# Patient Record
Sex: FEMALE | Race: WHITE | NOT HISPANIC OR LATINO | Employment: FULL TIME | URBAN - METROPOLITAN AREA
[De-identification: names, ages, dates, MRNs, and addresses within clinical notes are randomized per-mention and may not be internally consistent; named-entity substitution may affect disease eponyms.]

---

## 2020-12-30 ENCOUNTER — LAB VISIT (OUTPATIENT)
Dept: PRIMARY CARE CLINIC | Facility: OTHER | Age: 40
End: 2020-12-30
Attending: INTERNAL MEDICINE
Payer: COMMERCIAL

## 2020-12-30 DIAGNOSIS — Z03.818 ENCOUNTER FOR OBSERVATION FOR SUSPECTED EXPOSURE TO OTHER BIOLOGICAL AGENTS RULED OUT: ICD-10-CM

## 2020-12-30 PROCEDURE — U0003 INFECTIOUS AGENT DETECTION BY NUCLEIC ACID (DNA OR RNA); SEVERE ACUTE RESPIRATORY SYNDROME CORONAVIRUS 2 (SARS-COV-2) (CORONAVIRUS DISEASE [COVID-19]), AMPLIFIED PROBE TECHNIQUE, MAKING USE OF HIGH THROUGHPUT TECHNOLOGIES AS DESCRIBED BY CMS-2020-01-R: HCPCS

## 2020-12-31 LAB — SARS-COV-2 RNA RESP QL NAA+PROBE: NOT DETECTED

## 2021-08-02 ENCOUNTER — TELEPHONE (OUTPATIENT)
Dept: OBSTETRICS AND GYNECOLOGY | Facility: CLINIC | Age: 41
End: 2021-08-02

## 2021-11-12 ENCOUNTER — OFFICE VISIT (OUTPATIENT)
Dept: OBSTETRICS AND GYNECOLOGY | Facility: CLINIC | Age: 41
End: 2021-11-12
Payer: COMMERCIAL

## 2021-11-12 VITALS — WEIGHT: 140.63 LBS | BODY MASS INDEX: 22.7 KG/M2

## 2021-11-12 DIAGNOSIS — Z01.419 ENCOUNTER FOR ANNUAL ROUTINE GYNECOLOGICAL EXAMINATION: Primary | ICD-10-CM

## 2021-11-12 PROCEDURE — 99396 PR PREVENTIVE VISIT,EST,40-64: ICD-10-PCS | Mod: S$GLB,,, | Performed by: OBSTETRICS & GYNECOLOGY

## 2021-11-12 PROCEDURE — 3008F BODY MASS INDEX DOCD: CPT | Mod: CPTII,S$GLB,, | Performed by: OBSTETRICS & GYNECOLOGY

## 2021-11-12 PROCEDURE — 1160F PR REVIEW ALL MEDS BY PRESCRIBER/CLIN PHARMACIST DOCUMENTED: ICD-10-PCS | Mod: CPTII,S$GLB,, | Performed by: OBSTETRICS & GYNECOLOGY

## 2021-11-12 PROCEDURE — 99396 PREV VISIT EST AGE 40-64: CPT | Mod: S$GLB,,, | Performed by: OBSTETRICS & GYNECOLOGY

## 2021-11-12 PROCEDURE — 3008F PR BODY MASS INDEX (BMI) DOCUMENTED: ICD-10-PCS | Mod: CPTII,S$GLB,, | Performed by: OBSTETRICS & GYNECOLOGY

## 2021-11-12 PROCEDURE — 88175 CYTOPATH C/V AUTO FLUID REDO: CPT | Performed by: OBSTETRICS & GYNECOLOGY

## 2021-11-12 PROCEDURE — 1159F MED LIST DOCD IN RCRD: CPT | Mod: CPTII,S$GLB,, | Performed by: OBSTETRICS & GYNECOLOGY

## 2021-11-12 PROCEDURE — 99999 PR PBB SHADOW E&M-EST. PATIENT-LVL III: ICD-10-PCS | Mod: PBBFAC,,, | Performed by: OBSTETRICS & GYNECOLOGY

## 2021-11-12 PROCEDURE — 99999 PR PBB SHADOW E&M-EST. PATIENT-LVL III: CPT | Mod: PBBFAC,,, | Performed by: OBSTETRICS & GYNECOLOGY

## 2021-11-12 PROCEDURE — 1159F PR MEDICATION LIST DOCUMENTED IN MEDICAL RECORD: ICD-10-PCS | Mod: CPTII,S$GLB,, | Performed by: OBSTETRICS & GYNECOLOGY

## 2021-11-12 PROCEDURE — 1160F RVW MEDS BY RX/DR IN RCRD: CPT | Mod: CPTII,S$GLB,, | Performed by: OBSTETRICS & GYNECOLOGY

## 2021-11-20 LAB
FINAL PATHOLOGIC DIAGNOSIS: NORMAL
Lab: NORMAL

## 2022-08-09 ENCOUNTER — PATIENT MESSAGE (OUTPATIENT)
Dept: OBSTETRICS AND GYNECOLOGY | Facility: CLINIC | Age: 42
End: 2022-08-09
Payer: COMMERCIAL

## 2022-10-07 ENCOUNTER — LAB VISIT (OUTPATIENT)
Dept: LAB | Facility: HOSPITAL | Age: 42
End: 2022-10-07
Attending: MEDICAL GENETICS
Payer: COMMERCIAL

## 2022-10-07 DIAGNOSIS — Z84.89 FAMILY HISTORY OF GENETIC DISORDER: ICD-10-CM

## 2022-10-07 DIAGNOSIS — Z84.89 FAMILY HISTORY OF GENETIC DISORDER: Primary | ICD-10-CM

## 2022-10-07 PROCEDURE — 36415 COLL VENOUS BLD VENIPUNCTURE: CPT | Performed by: MEDICAL GENETICS

## 2022-10-13 LAB
GENETIC COUNSELING?: YES
GENSO SPECIMEN TYPE: NORMAL
MISCELLANEOUS GENETIC TEST NAME: NORMAL
PARTENTAL OR SIBLING TESTING?: YES
REFERENCE LAB: NORMAL
TEST RESULT: NORMAL

## 2022-11-23 ENCOUNTER — OFFICE VISIT (OUTPATIENT)
Dept: OBSTETRICS AND GYNECOLOGY | Facility: CLINIC | Age: 42
End: 2022-11-23
Payer: COMMERCIAL

## 2022-11-23 VITALS
RESPIRATION RATE: 18 BRPM | SYSTOLIC BLOOD PRESSURE: 124 MMHG | WEIGHT: 119.06 LBS | HEIGHT: 65 IN | DIASTOLIC BLOOD PRESSURE: 80 MMHG | BODY MASS INDEX: 19.83 KG/M2 | HEART RATE: 78 BPM

## 2022-11-23 DIAGNOSIS — Z12.4 SCREENING FOR MALIGNANT NEOPLASM OF CERVIX: Primary | ICD-10-CM

## 2022-11-23 DIAGNOSIS — Z01.419 ENCOUNTER FOR ANNUAL ROUTINE GYNECOLOGICAL EXAMINATION: ICD-10-CM

## 2022-11-23 DIAGNOSIS — Z12.39 SCREENING BREAST EXAMINATION: ICD-10-CM

## 2022-11-23 PROCEDURE — 3079F DIAST BP 80-89 MM HG: CPT | Mod: CPTII,S$GLB,, | Performed by: OBSTETRICS & GYNECOLOGY

## 2022-11-23 PROCEDURE — 1160F RVW MEDS BY RX/DR IN RCRD: CPT | Mod: CPTII,S$GLB,, | Performed by: OBSTETRICS & GYNECOLOGY

## 2022-11-23 PROCEDURE — 3079F PR MOST RECENT DIASTOLIC BLOOD PRESSURE 80-89 MM HG: ICD-10-PCS | Mod: CPTII,S$GLB,, | Performed by: OBSTETRICS & GYNECOLOGY

## 2022-11-23 PROCEDURE — 1160F PR REVIEW ALL MEDS BY PRESCRIBER/CLIN PHARMACIST DOCUMENTED: ICD-10-PCS | Mod: CPTII,S$GLB,, | Performed by: OBSTETRICS & GYNECOLOGY

## 2022-11-23 PROCEDURE — 99396 PREV VISIT EST AGE 40-64: CPT | Mod: S$GLB,,, | Performed by: OBSTETRICS & GYNECOLOGY

## 2022-11-23 PROCEDURE — 3074F PR MOST RECENT SYSTOLIC BLOOD PRESSURE < 130 MM HG: ICD-10-PCS | Mod: CPTII,S$GLB,, | Performed by: OBSTETRICS & GYNECOLOGY

## 2022-11-23 PROCEDURE — 99999 PR PBB SHADOW E&M-EST. PATIENT-LVL IV: CPT | Mod: PBBFAC,,, | Performed by: OBSTETRICS & GYNECOLOGY

## 2022-11-23 PROCEDURE — 1159F MED LIST DOCD IN RCRD: CPT | Mod: CPTII,S$GLB,, | Performed by: OBSTETRICS & GYNECOLOGY

## 2022-11-23 PROCEDURE — 3074F SYST BP LT 130 MM HG: CPT | Mod: CPTII,S$GLB,, | Performed by: OBSTETRICS & GYNECOLOGY

## 2022-11-23 PROCEDURE — 3008F PR BODY MASS INDEX (BMI) DOCUMENTED: ICD-10-PCS | Mod: CPTII,S$GLB,, | Performed by: OBSTETRICS & GYNECOLOGY

## 2022-11-23 PROCEDURE — 99396 PR PREVENTIVE VISIT,EST,40-64: ICD-10-PCS | Mod: S$GLB,,, | Performed by: OBSTETRICS & GYNECOLOGY

## 2022-11-23 PROCEDURE — 3008F BODY MASS INDEX DOCD: CPT | Mod: CPTII,S$GLB,, | Performed by: OBSTETRICS & GYNECOLOGY

## 2022-11-23 PROCEDURE — 88175 CYTOPATH C/V AUTO FLUID REDO: CPT | Performed by: OBSTETRICS & GYNECOLOGY

## 2022-11-23 PROCEDURE — 1159F PR MEDICATION LIST DOCUMENTED IN MEDICAL RECORD: ICD-10-PCS | Mod: CPTII,S$GLB,, | Performed by: OBSTETRICS & GYNECOLOGY

## 2022-11-23 PROCEDURE — 99999 PR PBB SHADOW E&M-EST. PATIENT-LVL IV: ICD-10-PCS | Mod: PBBFAC,,, | Performed by: OBSTETRICS & GYNECOLOGY

## 2022-11-23 NOTE — PROGRESS NOTES
Chief Complaint   Patient presents with    Annual Exam       History and Physical:  No LMP recorded.       Zahira Kovacs is a 41 y.o. No obstetric history on file. WF who presents today for her routine annual GYN exam. The patient has no Gynecology complaints today. S/p moraima and f/u CT      Allergies:   Review of patient's allergies indicates:   Allergen Reactions    Aspirin Shortness Of Breath    Sulfamethoxazole-trimethoprim Shortness Of Breath    Vistaril [hydroxyzine pamoate] Shortness Of Breath    Gadolinium-containing contrast media Rash     Mild localized rash in chest area; denied (-) itchiness and (-) shortness of breath  Mild localized rash in chest area; denied (-) itchiness and (-) shortness of breath         Past Medical History:   Diagnosis Date    Allergy     Breast disorder     Depression     GERD (gastroesophageal reflux disease)        Past Surgical History:   Procedure Laterality Date     SECTION      CRANIOTOMY  2021    micro decompression of facial nerve    LAPAROSCOPIC CHOLECYSTECTOMY N/A 2022    Procedure: CHOLECYSTECTOMY, LAPAROSCOPIC;  Surgeon: Raman Camargo MD;  Location: [facility];  Service: General;  Laterality: N/A;       MEDS:   Current Outpatient Medications on File Prior to Visit   Medication Sig Dispense Refill    cholecalciferol, vitamin D3, (VITAMIN D3) 25 mcg (1,000 unit) capsule Take 1,000 Units by mouth once daily.      enzymes,digestive (DIGESTIVE ENZYMES ORAL) Take by mouth.      multivitamin capsule Take 1 capsule by mouth once daily.      omega-3 fatty acids/fish oil (FISH OIL-OMEGA-3 FATTY ACIDS) 300-1,000 mg capsule Take by mouth once daily.      onabotulinumtoxinA (BOTOX INJ) Inject as directed. Every 4 months facial area      sertraline (ZOLOFT) 50 MG tablet Take 1 tablet by mouth once daily.      B5/B6/PABA/cordy/rhodi/ginseng (ADRENAL ESSENCE ORAL) Take by mouth.      ferrous sulfate (FEOSOL) Tab tablet Take 1 tablet by mouth daily with  "breakfast.       No current facility-administered medications on file prior to visit.       OB History    No obstetric history on file.         Social History     Socioeconomic History    Marital status:    Tobacco Use    Smoking status: Never    Smokeless tobacco: Never   Substance and Sexual Activity    Alcohol use: Yes     Comment: social    Drug use: No       Family History   Problem Relation Age of Onset    Cancer Mother         leukemia    Migraines Mother     Hypertension Father          Past medical and surgical history reviewed.   I have reviewed the patient's medical history in detail and updated the computerized patient record.        Review of System:   General: no chills, fever, night sweats, weight gain or weight loss  Psychological: no depression or suicidal ideation  Breasts: no new or changing breast lumps, nipple discharge or masses.  Respiratory: no cough, shortness of breath, or wheezing  Cardiovascular: no chest pain or dyspnea on exertion  Gastrointestinal: no abdominal pain, change in bowel habits, or black or bloody stools  Genito-Urinary: no incontinence, urinary frequency/urgency or vulvar/vaginal symptoms, pelvic pain or abnormal vaginal bleeding.  Musculoskeletal: no gait disturbance or muscular weakness      Physical Exam:   /80 (BP Location: Right arm)   Pulse 78   Resp 18   Ht 5' 5" (1.651 m)   Wt 54 kg (119 lb 0.8 oz)   BMI 19.81 kg/m²   Constitutional: She appears alert and responsive. She appears well-developed, well-groomed, and well-nourished. No distress.   HENT:   Head: Normocephalic and atraumatic.   Eyes: Conjunctivae and EOM are normal. No scleral icterus.   Neck: Symmetrical. Normal range of motion. Neck supple. No tracheal deviation present. THYROID:  without masses or tenderness.  Cardiovascular: Normal rate, no rhythm abnormality noted. Extremities without swelling or edema, warm.    Pulmonary/Chest: Normal respiratory Effort. No distress or retractions. " She exhibits no tenderness.  Breasts: are symmetrical.no masses    Right breast exhibits no inverted nipple, no mass, no nipple discharge, no skin change and no tenderness.   Left breast exhibits no inverted nipple, cyst 1.5 cm mass, no nipple discharge, no skin change and no tenderness.  Abdominal: Soft. She exhibits no distension, hernias or masses. There is no tenderness. No enlargement of liver edge or spleen.  There is no rebound and no guarding.   Genitourinary:    External rectal exam shows no thrombosed external hemorrhoids, no lesions.     Pelvic exam was performed with patient supine.   No labial fusion, and symmetrical.    There is no rash, lesion or injury on the right labia.   There is no rash, lesion or injury on the left labia.   No bleeding and no signs of injury around the vaginal introitus, urethral meatus is normal size and without prolapse or lesions, urethra well supported. The cervix is visualized with no discharge, lesions or friability.   No vaginal discharge found.    No significant Cystocele, Enterocele or rectocele, and cervix and uterus well supported.   Bimanual exam:   The urethra is normal to palpation and there are no palpable vaginal wall masses.   Uterus is not deviated, not enlarged, not fixed, normal shape and not tender.   Cervix exhibits no motion tenderness.    Right adnexum displays no mass or nodularity and no tenderness.   Left adnexum displays no mass or nodularity and no tenderness.  Musculoskeletal: Normal range of motion.   Lymphadenopathy: No inguinal adenopathy present.   Neurological: She is alert and oriented to person, place, and time. Coordination normal.   Skin: Skin is warm and dry. She is not diaphoretic. No rashes, lesions or ulcers.   Psychiatric: She has a normal mood and affect, oriented to person, place, and time.      Assessment:   Normal annual GYN exam  1. Screening for malignant neoplasm of cervix        2. Encounter for annual routine gynecological  examination        Mobile cyst left breast  S/p moraima  Son L1 CAM mutation seeing   Plan:   Dr renzo rausch breast  PAP  Mammogram done July  Follow up in 1 year.  Patient informed will be contacted with results within 2 weeks. Encouraged to please call back or email if she has not heard from us by then.

## 2022-12-01 LAB
FINAL PATHOLOGIC DIAGNOSIS: NORMAL
Lab: NORMAL

## 2023-11-27 ENCOUNTER — OFFICE VISIT (OUTPATIENT)
Dept: OBSTETRICS AND GYNECOLOGY | Facility: CLINIC | Age: 43
End: 2023-11-27
Payer: COMMERCIAL

## 2023-11-27 VITALS
SYSTOLIC BLOOD PRESSURE: 102 MMHG | HEIGHT: 65 IN | WEIGHT: 124.56 LBS | BODY MASS INDEX: 20.75 KG/M2 | DIASTOLIC BLOOD PRESSURE: 64 MMHG

## 2023-11-27 DIAGNOSIS — Z01.419 ENCOUNTER FOR ANNUAL ROUTINE GYNECOLOGICAL EXAMINATION: Primary | ICD-10-CM

## 2023-11-27 PROCEDURE — 3008F BODY MASS INDEX DOCD: CPT | Mod: CPTII,S$GLB,, | Performed by: OBSTETRICS & GYNECOLOGY

## 2023-11-27 PROCEDURE — 99999 PR PBB SHADOW E&M-EST. PATIENT-LVL III: CPT | Mod: PBBFAC,,, | Performed by: OBSTETRICS & GYNECOLOGY

## 2023-11-27 PROCEDURE — 1159F PR MEDICATION LIST DOCUMENTED IN MEDICAL RECORD: ICD-10-PCS | Mod: CPTII,S$GLB,, | Performed by: OBSTETRICS & GYNECOLOGY

## 2023-11-27 PROCEDURE — 88141 PR  CYTOPATH CERV/VAG INTERPRET: ICD-10-PCS | Mod: ,,, | Performed by: PATHOLOGY

## 2023-11-27 PROCEDURE — 3078F DIAST BP <80 MM HG: CPT | Mod: CPTII,S$GLB,, | Performed by: OBSTETRICS & GYNECOLOGY

## 2023-11-27 PROCEDURE — 3074F PR MOST RECENT SYSTOLIC BLOOD PRESSURE < 130 MM HG: ICD-10-PCS | Mod: CPTII,S$GLB,, | Performed by: OBSTETRICS & GYNECOLOGY

## 2023-11-27 PROCEDURE — 3078F PR MOST RECENT DIASTOLIC BLOOD PRESSURE < 80 MM HG: ICD-10-PCS | Mod: CPTII,S$GLB,, | Performed by: OBSTETRICS & GYNECOLOGY

## 2023-11-27 PROCEDURE — 1160F PR REVIEW ALL MEDS BY PRESCRIBER/CLIN PHARMACIST DOCUMENTED: ICD-10-PCS | Mod: CPTII,S$GLB,, | Performed by: OBSTETRICS & GYNECOLOGY

## 2023-11-27 PROCEDURE — 99396 PREV VISIT EST AGE 40-64: CPT | Mod: S$GLB,,, | Performed by: OBSTETRICS & GYNECOLOGY

## 2023-11-27 PROCEDURE — 87624 HPV HI-RISK TYP POOLED RSLT: CPT | Performed by: OBSTETRICS & GYNECOLOGY

## 2023-11-27 PROCEDURE — 1159F MED LIST DOCD IN RCRD: CPT | Mod: CPTII,S$GLB,, | Performed by: OBSTETRICS & GYNECOLOGY

## 2023-11-27 PROCEDURE — 88175 CYTOPATH C/V AUTO FLUID REDO: CPT | Performed by: PATHOLOGY

## 2023-11-27 PROCEDURE — 3008F PR BODY MASS INDEX (BMI) DOCUMENTED: ICD-10-PCS | Mod: CPTII,S$GLB,, | Performed by: OBSTETRICS & GYNECOLOGY

## 2023-11-27 PROCEDURE — 88141 CYTOPATH C/V INTERPRET: CPT | Mod: ,,, | Performed by: PATHOLOGY

## 2023-11-27 PROCEDURE — 3074F SYST BP LT 130 MM HG: CPT | Mod: CPTII,S$GLB,, | Performed by: OBSTETRICS & GYNECOLOGY

## 2023-11-27 PROCEDURE — 3044F PR MOST RECENT HEMOGLOBIN A1C LEVEL <7.0%: ICD-10-PCS | Mod: CPTII,S$GLB,, | Performed by: OBSTETRICS & GYNECOLOGY

## 2023-11-27 PROCEDURE — 1160F RVW MEDS BY RX/DR IN RCRD: CPT | Mod: CPTII,S$GLB,, | Performed by: OBSTETRICS & GYNECOLOGY

## 2023-11-27 PROCEDURE — 99396 PR PREVENTIVE VISIT,EST,40-64: ICD-10-PCS | Mod: S$GLB,,, | Performed by: OBSTETRICS & GYNECOLOGY

## 2023-11-27 PROCEDURE — 99999 PR PBB SHADOW E&M-EST. PATIENT-LVL III: ICD-10-PCS | Mod: PBBFAC,,, | Performed by: OBSTETRICS & GYNECOLOGY

## 2023-11-27 PROCEDURE — 3044F HG A1C LEVEL LT 7.0%: CPT | Mod: CPTII,S$GLB,, | Performed by: OBSTETRICS & GYNECOLOGY

## 2023-11-27 NOTE — PROGRESS NOTES
Chief Complaint   Patient presents with    Well Woman       History and Physical:  Patient's last menstrual period was 2023 (exact date).       Zahira Kovacs is a 42 y.o. No obstetric history on file. WF who presents today for her routine annual GYN exam. The patient has no Gynecology complaints today. No Gyn c/o      Allergies:   Review of patient's allergies indicates:   Allergen Reactions    Aspirin Shortness Of Breath    Sulfamethoxazole-trimethoprim Shortness Of Breath    Vistaril [hydroxyzine pamoate] Shortness Of Breath    Gadolinium-containing contrast media Rash     Mild localized rash in chest area; denied (-) itchiness and (-) shortness of breath  Mild localized rash in chest area; denied (-) itchiness and (-) shortness of breath         Past Medical History:   Diagnosis Date    Allergy     Breast disorder     Depression     GERD (gastroesophageal reflux disease)        Past Surgical History:   Procedure Laterality Date     SECTION      CRANIOTOMY  2021    micro decompression of facial nerve    LAPAROSCOPIC CHOLECYSTECTOMY N/A 2022    Procedure: CHOLECYSTECTOMY, LAPAROSCOPIC;  Surgeon: Raman Camargo MD;  Location: [facility];  Service: General;  Laterality: N/A;       MEDS:   Current Outpatient Medications on File Prior to Visit   Medication Sig Dispense Refill    enzymes,digestive (DIGESTIVE ENZYMES ORAL) Take by mouth.      ferrous sulfate (FEOSOL) Tab tablet Take 1 tablet by mouth daily with breakfast.      multivitamin capsule Take 1 capsule by mouth once daily.      omega-3 fatty acids/fish oil (FISH OIL-OMEGA-3 FATTY ACIDS) 300-1,000 mg capsule Take by mouth once daily.      onabotulinumtoxinA (BOTOX INJ) Inject as directed. Every 4 months facial area      [DISCONTINUED] B5/B6/PABA/cordy/rhodi/ginseng (ADRENAL ESSENCE ORAL) Take by mouth.      [DISCONTINUED] cholecalciferol, vitamin D3, (VITAMIN D3) 25 mcg (1,000 unit) capsule Take 1,000 Units by mouth once daily.       "[DISCONTINUED] sertraline (ZOLOFT) 50 MG tablet Take 1 tablet by mouth once daily.       No current facility-administered medications on file prior to visit.       OB History    No obstetric history on file.         Social History     Socioeconomic History    Marital status:    Tobacco Use    Smoking status: Never    Smokeless tobacco: Never   Substance and Sexual Activity    Alcohol use: Yes     Comment: social    Drug use: No       Family History   Problem Relation Age of Onset    Cancer Mother         leukemia    Migraines Mother     Hypertension Father          Past medical and surgical history reviewed.   I have reviewed the patient's medical history in detail and updated the computerized patient record.        Review of System:   General: no chills, fever, night sweats, weight gain or weight loss  Psychological: no depression or suicidal ideation  Breasts: no new or changing breast lumps, nipple discharge or masses.  Respiratory: no cough, shortness of breath, or wheezing  Cardiovascular: no chest pain or dyspnea on exertion  Gastrointestinal: no abdominal pain, change in bowel habits, or black or bloody stools  Genito-Urinary: no incontinence, urinary frequency/urgency or vulvar/vaginal symptoms, pelvic pain or abnormal vaginal bleeding.  Musculoskeletal: no gait disturbance or muscular weakness      Physical Exam:   /64   Ht 5' 5" (1.651 m)   Wt 56.5 kg (124 lb 9 oz)   LMP 11/19/2023 (Exact Date)   BMI 20.73 kg/m²   Constitutional: She appears alert and responsive. She appears well-developed, well-groomed, and well-nourished. No distress.   HENT:   Head: Normocephalic and atraumatic.   Eyes: Conjunctivae and EOM are normal. No scleral icterus.   Neck: Symmetrical. Normal range of motion. Neck supple. No tracheal deviation present. THYROID:  without masses or tenderness.  Cardiovascular: Normal rate, no rhythm abnormality noted. Extremities without swelling or edema, warm.    Pulmonary/Chest: " Normal respiratory Effort. No distress or retractions. She exhibits no tenderness.  Breasts: are symmetrical.no masses   Right breast exhibits no inverted nipple, no mass, no nipple discharge, no skin change and no tenderness.   Left breast exhibits no inverted nipple, no mass, no nipple discharge, no skin change and no tenderness.  Abdominal: Soft. She exhibits no distension, hernias or masses. There is no tenderness. No enlargement of liver edge or spleen.  There is no rebound and no guarding.   Genitourinary:    External rectal exam shows no thrombosed external hemorrhoids, no lesions.     Pelvic exam was performed with patient supine.   No labial fusion, and symmetrical.    There is no rash, lesion or injury on the right labia.   There is no rash, lesion or injury on the left labia.   No bleeding and no signs of injury around the vaginal introitus, urethral meatus is normal size and without prolapse or lesions, urethra well supported. The cervix is visualized with no discharge, lesions or friability.   No vaginal discharge found.    No significant Cystocele, Enterocele or rectocele, and cervix and uterus well supported.   Bimanual exam:   The urethra is normal to palpation and there are no palpable vaginal wall masses.   Uterus is not deviated, not enlarged, not fixed, normal shape and not tender.   Cervix exhibits no motion tenderness.    Right adnexum displays no mass or nodularity and no tenderness.   Left adnexum displays no mass or nodularity and no tenderness.  Musculoskeletal: Normal range of motion.   Lymphadenopathy: No inguinal adenopathy present.   Neurological: She is alert and oriented to person, place, and time. Coordination normal.   Skin: Skin is warm and dry. She is not diaphoretic. No rashes, lesions or ulcers.   Psychiatric: She has a normal mood and affect, oriented to person, place, and time.      Assessment:   Normal annual GYN exam  1. Encounter for annual routine gynecological examination   Liquid-Based Pap Smear, Screening        Prev craniotomy-     Plan:   PAP  Mammogram  Follow up in 1 year.  Patient informed will be contacted with results within 2 weeks. Encouraged to please call back or email if she has not heard from us by then.

## 2023-12-05 LAB
FINAL PATHOLOGIC DIAGNOSIS: ABNORMAL
Lab: ABNORMAL

## 2023-12-06 ENCOUNTER — PATIENT MESSAGE (OUTPATIENT)
Dept: OBSTETRICS AND GYNECOLOGY | Facility: CLINIC | Age: 43
End: 2023-12-06
Payer: COMMERCIAL

## 2023-12-07 LAB
HPV HR 12 DNA SPEC QL NAA+PROBE: NEGATIVE
HPV16 AG SPEC QL: NEGATIVE
HPV18 DNA SPEC QL NAA+PROBE: NEGATIVE

## 2024-05-27 ENCOUNTER — OFFICE VISIT (OUTPATIENT)
Dept: OBSTETRICS AND GYNECOLOGY | Facility: CLINIC | Age: 44
End: 2024-05-27
Payer: COMMERCIAL

## 2024-05-27 ENCOUNTER — LAB VISIT (OUTPATIENT)
Dept: LAB | Facility: HOSPITAL | Age: 44
End: 2024-05-27
Attending: OBSTETRICS & GYNECOLOGY
Payer: COMMERCIAL

## 2024-05-27 VITALS
SYSTOLIC BLOOD PRESSURE: 100 MMHG | BODY MASS INDEX: 19.66 KG/M2 | WEIGHT: 118.19 LBS | DIASTOLIC BLOOD PRESSURE: 72 MMHG

## 2024-05-27 DIAGNOSIS — R10.2 PELVIC PAIN: ICD-10-CM

## 2024-05-27 DIAGNOSIS — T14.8XXA BRUISING: ICD-10-CM

## 2024-05-27 DIAGNOSIS — R87.619 ABNORMAL CERVICAL PAPANICOLAOU SMEAR, UNSPECIFIED ABNORMAL PAP FINDING: Primary | ICD-10-CM

## 2024-05-27 LAB
BASOPHILS # BLD AUTO: 0.02 K/UL (ref 0–0.2)
BASOPHILS NFR BLD: 0.3 % (ref 0–1.9)
DIFFERENTIAL METHOD BLD: ABNORMAL
EOSINOPHIL # BLD AUTO: 0.2 K/UL (ref 0–0.5)
EOSINOPHIL NFR BLD: 2.7 % (ref 0–8)
ERYTHROCYTE [DISTWIDTH] IN BLOOD BY AUTOMATED COUNT: 12.5 % (ref 11.5–14.5)
HCT VFR BLD AUTO: 38.8 % (ref 37–48.5)
HGB BLD-MCNC: 13.2 G/DL (ref 12–16)
IMM GRANULOCYTES # BLD AUTO: 0.02 K/UL (ref 0–0.04)
IMM GRANULOCYTES NFR BLD AUTO: 0.3 % (ref 0–0.5)
LYMPHOCYTES # BLD AUTO: 1.6 K/UL (ref 1–4.8)
LYMPHOCYTES NFR BLD: 21.7 % (ref 18–48)
MCH RBC QN AUTO: 32.8 PG (ref 27–31)
MCHC RBC AUTO-ENTMCNC: 34 G/DL (ref 32–36)
MCV RBC AUTO: 97 FL (ref 82–98)
MONOCYTES # BLD AUTO: 0.6 K/UL (ref 0.3–1)
MONOCYTES NFR BLD: 8.7 % (ref 4–15)
NEUTROPHILS # BLD AUTO: 4.9 K/UL (ref 1.8–7.7)
NEUTROPHILS NFR BLD: 66.3 % (ref 38–73)
NRBC BLD-RTO: 0 /100 WBC
PLATELET # BLD AUTO: 224 K/UL (ref 150–450)
PMV BLD AUTO: 11.6 FL (ref 9.2–12.9)
RBC # BLD AUTO: 4.02 M/UL (ref 4–5.4)
WBC # BLD AUTO: 7.36 K/UL (ref 3.9–12.7)

## 2024-05-27 PROCEDURE — 3008F BODY MASS INDEX DOCD: CPT | Mod: CPTII,S$GLB,, | Performed by: OBSTETRICS & GYNECOLOGY

## 2024-05-27 PROCEDURE — 3074F SYST BP LT 130 MM HG: CPT | Mod: CPTII,S$GLB,, | Performed by: OBSTETRICS & GYNECOLOGY

## 2024-05-27 PROCEDURE — 3078F DIAST BP <80 MM HG: CPT | Mod: CPTII,S$GLB,, | Performed by: OBSTETRICS & GYNECOLOGY

## 2024-05-27 PROCEDURE — 1160F RVW MEDS BY RX/DR IN RCRD: CPT | Mod: CPTII,S$GLB,, | Performed by: OBSTETRICS & GYNECOLOGY

## 2024-05-27 PROCEDURE — 88175 CYTOPATH C/V AUTO FLUID REDO: CPT | Performed by: PATHOLOGY

## 2024-05-27 PROCEDURE — 85025 COMPLETE CBC W/AUTO DIFF WBC: CPT | Performed by: OBSTETRICS & GYNECOLOGY

## 2024-05-27 PROCEDURE — 36415 COLL VENOUS BLD VENIPUNCTURE: CPT | Mod: PN | Performed by: OBSTETRICS & GYNECOLOGY

## 2024-05-27 PROCEDURE — 99214 OFFICE O/P EST MOD 30 MIN: CPT | Mod: S$GLB,,, | Performed by: OBSTETRICS & GYNECOLOGY

## 2024-05-27 PROCEDURE — 1159F MED LIST DOCD IN RCRD: CPT | Mod: CPTII,S$GLB,, | Performed by: OBSTETRICS & GYNECOLOGY

## 2024-05-27 PROCEDURE — 88141 CYTOPATH C/V INTERPRET: CPT | Mod: ,,, | Performed by: PATHOLOGY

## 2024-05-27 PROCEDURE — 99999 PR PBB SHADOW E&M-EST. PATIENT-LVL III: CPT | Mod: PBBFAC,,, | Performed by: OBSTETRICS & GYNECOLOGY

## 2024-05-27 RX ORDER — PROPRANOLOL HYDROCHLORIDE 10 MG/1
TABLET ORAL
COMMUNITY
Start: 2023-08-08

## 2024-05-27 RX ORDER — DEXTROMETHORPHAN HYDROBROMIDE, BUPROPION HYDROCHLORIDE 105; 45 MG/1; MG/1
TABLET, MULTILAYER, EXTENDED RELEASE ORAL
COMMUNITY

## 2024-05-27 RX ORDER — LAMOTRIGINE 100 MG/1
TABLET ORAL
COMMUNITY

## 2024-05-27 RX ORDER — BUPROPION HYDROCHLORIDE 100 MG/1
TABLET, FILM COATED ORAL
COMMUNITY

## 2024-05-27 RX ORDER — NALTREXONE HYDROCHLORIDE 50 MG/1
50 TABLET, FILM COATED ORAL
COMMUNITY
Start: 2024-05-20

## 2024-05-27 RX ORDER — LEVOMEFOLATE CALCIUM 15 MG
TABLET ORAL
COMMUNITY

## 2024-05-27 RX ORDER — LAMOTRIGINE 25 MG/1
TABLET ORAL
COMMUNITY
Start: 2024-05-19

## 2024-05-27 RX ORDER — VORTIOXETINE 5 MG/1
TABLET, FILM COATED ORAL
COMMUNITY
Start: 2023-10-30

## 2024-05-27 NOTE — PROGRESS NOTES
Chief Complaint   Patient presents with    Follow-up     Re-pap.  Pt interested in HPV shot, pt told it was good for head and neck cancers.       History of Present Illness   43 y.o. WF  No obstetric history on file. patient presents today for abnormal pap.  Working on depression and weight loss.  C/o easy bruising    Past medical and surgical history reviewed.   I have reviewed the patient's medical history in detail and updated the computerized patient record.    Review of patient's allergies indicates:   Allergen Reactions    Aspirin Shortness Of Breath    Sulfamethoxazole-trimethoprim Shortness Of Breath    Vistaril [hydroxyzine pamoate] Shortness Of Breath    Gadolinium-containing contrast media Rash     Mild localized rash in chest area; denied (-) itchiness and (-) shortness of breath  Mild localized rash in chest area; denied (-) itchiness and (-) shortness of breath           Review of Systems -   GEN ROS: bruising  Breast ROS: negative for breast lumps  Genito-Urinary ROS: negative      Physical Examination:  /72   Wt 53.6 kg (118 lb 2.7 oz)   LMP 05/02/2024   BMI 19.66 kg/m²      OBGyn Exam   Abd: non tender, no rebound, no guarding, no organomegaly  V,v: no lesions  Cervix: no lesions  Uterus: nssp  Adnexa: no masses,  tender left adnex  Assessment:    1. Abnormal cervical Papanicolaou smear, unspecified abnormal pap finding  Liquid-Based Pap Smear, Diagnostic      2. Bruising  CBC Auto Differential      3. Pelvic pain  US Pelvis Comp with Transvag NON-OB (xpd          Plan:  CBC  Eval with PCP  Jenni  Discussed HPV vaccine  Patient informed will be contacted with results within 2 weeks. Encouraged to please call back or email if she has not heard from us by then.

## 2024-05-31 LAB
FINAL PATHOLOGIC DIAGNOSIS: ABNORMAL
Lab: ABNORMAL

## 2024-06-11 ENCOUNTER — PATIENT MESSAGE (OUTPATIENT)
Dept: OBSTETRICS AND GYNECOLOGY | Facility: CLINIC | Age: 44
End: 2024-06-11
Payer: COMMERCIAL

## 2024-06-14 ENCOUNTER — OFFICE VISIT (OUTPATIENT)
Dept: OBSTETRICS AND GYNECOLOGY | Facility: CLINIC | Age: 44
End: 2024-06-14
Payer: COMMERCIAL

## 2024-06-14 VITALS
SYSTOLIC BLOOD PRESSURE: 126 MMHG | DIASTOLIC BLOOD PRESSURE: 80 MMHG | WEIGHT: 114.88 LBS | BODY MASS INDEX: 19.11 KG/M2

## 2024-06-14 DIAGNOSIS — R87.612 LGSIL ON PAP SMEAR OF CERVIX: ICD-10-CM

## 2024-06-14 DIAGNOSIS — R87.619 ABNORMAL CERVICAL PAPANICOLAOU SMEAR, UNSPECIFIED ABNORMAL PAP FINDING: ICD-10-CM

## 2024-06-14 DIAGNOSIS — Z01.818 ENCOUNTER FOR PREOPERATIVE ASSESSMENT: Primary | ICD-10-CM

## 2024-06-14 DIAGNOSIS — R10.2 PELVIC PAIN: ICD-10-CM

## 2024-06-14 LAB
B-HCG UR QL: NEGATIVE
CTP QC/QA: YES

## 2024-06-14 PROCEDURE — 57454 BX/CURETT OF CERVIX W/SCOPE: CPT | Mod: S$GLB,,, | Performed by: OBSTETRICS & GYNECOLOGY

## 2024-06-14 PROCEDURE — 99499 UNLISTED E&M SERVICE: CPT | Mod: S$GLB,,, | Performed by: OBSTETRICS & GYNECOLOGY

## 2024-06-14 PROCEDURE — 88342 IMHCHEM/IMCYTCHM 1ST ANTB: CPT | Performed by: PATHOLOGY

## 2024-06-14 PROCEDURE — 99999 PR PBB SHADOW E&M-EST. PATIENT-LVL III: CPT | Mod: PBBFAC,,, | Performed by: OBSTETRICS & GYNECOLOGY

## 2024-06-14 PROCEDURE — 81025 URINE PREGNANCY TEST: CPT | Mod: S$GLB,,, | Performed by: OBSTETRICS & GYNECOLOGY

## 2024-06-14 PROCEDURE — 88305 TISSUE EXAM BY PATHOLOGIST: CPT | Mod: 59 | Performed by: PATHOLOGY

## 2024-06-14 NOTE — PROGRESS NOTES
COLPOSCOPY:  6/14/2024    PAP Result: LGSIL  1. Encounter for preoperative assessment  POCT Urine Pregnancy      2. Abnormal cervical Papanicolaou smear, unspecified abnormal pap finding        3. LGSIL on Pap smear of cervix        4. Pelvic pain            PRE-COLPOSCOPY PROCEDURE COUNSELING:  Discussed the abnormal pap test findings, HPV, need for colposcopy and possible biopsies to determine a diagnosis and plan of care, treatments available, the minimal risks of bleeding and infection with a colposcopy, alternatives to colposcopy and she agrees to proceed.    Procedure:   Speculum was placed. Cervix completely visualized. The transformation zone clearly visualized. Green filter activated: vascular abnormalities: not  seen  Green filter disengaged and acetic acid applied in the usual fashion:  AcetoWhite epithelium  seen.  Mosaic pattern not  seen.  Biopsy  performed. 1 o'clock  ECC  done.    Monsel's solution applied to biopsy site for hemostasis and tolerated well.   The speculum was removed.  The patient tolerated the procedure well.    POST COLPOSCOPY COUNSELING:   Manage post colposcopy cramping with NSAIDs, Tylenol or Rx per [employer].  Avoid anything in vagina (intercourse, douching, tampons) one week after the procedure.  Expect a clumpy blackish vaginal discharge (Monsel's solution) for several days.  Report bleeding heavier than a period, worsening pain, fever > 101.0 F, or foul-smelling vaginal discharge.  HPV vaccine recommended according to FDA age guidelines.  Importance of follow-up stressed.    Counseling lasted approximately 15 minutes and all her questions were answered.    Assessment:  1. Encounter for preoperative assessment  POCT Urine Pregnancy      2. Abnormal cervical Papanicolaou smear, unspecified abnormal pap finding        3. LGSIL on Pap smear of cervix        4. Pelvic pain            Plan:  ECC and Bx  If mild dysp pap 6 mos  Consider HPV vaccine    Patient informed will be  contacted within 2 weeks of results, either normal or abnormal. Please call if she has not heard from us by then.

## 2024-06-18 LAB
FINAL PATHOLOGIC DIAGNOSIS: NORMAL
GROSS: NORMAL
Lab: NORMAL

## 2024-07-08 ENCOUNTER — PATIENT MESSAGE (OUTPATIENT)
Dept: OBSTETRICS AND GYNECOLOGY | Facility: CLINIC | Age: 44
End: 2024-07-08
Payer: COMMERCIAL

## 2024-07-10 ENCOUNTER — CLINICAL SUPPORT (OUTPATIENT)
Dept: OBSTETRICS AND GYNECOLOGY | Facility: CLINIC | Age: 44
End: 2024-07-10
Payer: COMMERCIAL

## 2024-07-10 DIAGNOSIS — Z23 ENCOUNTER FOR ADMINISTRATION OF VACCINE: Primary | ICD-10-CM

## 2024-07-10 PROCEDURE — 90651 9VHPV VACCINE 2/3 DOSE IM: CPT | Mod: S$GLB,,, | Performed by: OBSTETRICS & GYNECOLOGY

## 2024-07-10 PROCEDURE — 99999 PR PBB SHADOW E&M-EST. PATIENT-LVL I: CPT | Mod: PBBFAC,,,

## 2024-07-10 PROCEDURE — 90471 IMMUNIZATION ADMIN: CPT | Mod: S$GLB,,, | Performed by: OBSTETRICS & GYNECOLOGY

## 2024-07-10 NOTE — PROGRESS NOTES
Patient presented to clinic for 1st Gardisil vaccine.  Name, , and allergies verified and reviewed.  Patient was administered injection in right deltoid and tolerated well.  No complications noted.  Patient waited in clinic for 15 minutes and no reactions seen.

## 2024-07-26 ENCOUNTER — OFFICE VISIT (OUTPATIENT)
Dept: NEUROLOGY | Facility: CLINIC | Age: 44
End: 2024-07-26
Payer: COMMERCIAL

## 2024-07-26 ENCOUNTER — PATIENT MESSAGE (OUTPATIENT)
Dept: NEUROLOGY | Facility: CLINIC | Age: 44
End: 2024-07-26

## 2024-07-26 DIAGNOSIS — G43.719 INTRACTABLE CHRONIC MIGRAINE WITHOUT AURA AND WITHOUT STATUS MIGRAINOSUS: ICD-10-CM

## 2024-07-26 DIAGNOSIS — R11.0 NAUSEA: ICD-10-CM

## 2024-07-26 DIAGNOSIS — R20.0 ANESTHESIA OF SKIN: Primary | ICD-10-CM

## 2024-07-26 DIAGNOSIS — R29.818 TRANSIENT NEUROLOGICAL SYMPTOMS: ICD-10-CM

## 2024-07-26 DIAGNOSIS — G43.419 INTRACTABLE HEMIPLEGIC MIGRAINE WITHOUT STATUS MIGRAINOSUS: ICD-10-CM

## 2024-07-26 PROCEDURE — 99999 PR PBB SHADOW E&M-EST. PATIENT-LVL III: CPT | Mod: PBBFAC,,, | Performed by: NURSE PRACTITIONER

## 2024-07-26 RX ORDER — ONDANSETRON 4 MG/1
4 TABLET, ORALLY DISINTEGRATING ORAL EVERY 6 HOURS PRN
Qty: 30 TABLET | Refills: 11 | Status: SHIPPED | OUTPATIENT
Start: 2024-07-26

## 2024-07-26 RX ORDER — UBROGEPANT 100 MG/1
TABLET ORAL
Qty: 16 TABLET | Refills: 11 | Status: SHIPPED | OUTPATIENT
Start: 2024-07-26

## 2024-07-26 RX ORDER — GALCANEZUMAB 120 MG/ML
120 INJECTION, SOLUTION SUBCUTANEOUS
Qty: 1 ML | Refills: 11 | Status: SHIPPED | OUTPATIENT
Start: 2024-08-26

## 2024-07-26 NOTE — PROGRESS NOTES
Date of service: 7/26/2024  Referring provider: Aaareferral Self    Subjective:      Chief complaint: Headache       Patient ID: Zahira Kovacs is a 43 y.o. female with depression, GERD, facial spasm with s/o microvascular decompression who presents for new patient evaluation of headache     History of Present Illness    ORIGINAL HEADACHE HISTORY - 7/26/24  Age at onset and course over time: in her 30's  Last month she had an episode of transient neurologic symptoms. She had an episode of near syncope and then weakness. She had numbness and tingling in various extremities, bilaterally. There was only mild and intermittent headaches with these symptoms. She has had recurrence three times this past week. She has new numbness in her abdomen during these episodes.   She has memory loss concerns.   Family history of headaches - mom, brother, one child    Last eye exam - 1-2 months ago  Location: either side of head  Quality:  [] pressure [] tight [] throbbing [x] sharp [] stabbing   Severity: current 4 with range 2-7  Duration: hours, days  Frequency: daily for years   Headaches awaken at night?:  no  Worst time of day: varies  Associated with: [x] photophobia [x]  phonophobia [] osmophobia [] blurred vision  [] double vision [] loss of appetite [] nausea [] vomiting [] dizziness [] vertigo  [] tinnitus [x] irritability [] sinus pressure [x] problems with concentration   [] neck tightness   Alleviated by:  [x] sleep [] darkness [x] massage [] heat [x] ice [] medication  Exacerbated by:  [] fatigue [x] light [x] noise [] smells [] coughing [] sneezing  [x] bending over [] ovulation [] menses [] alcohol [] change in weather []  stress  Ipsilateral autonomic: [] nasal congestion [] lacrimation [] ptosis [] injection [] edema [] foreign body sensation [] ear fullness   ICP:  [] transient visual obscurations  [x] tinnitus high pitched, steady, left ear, unrelated to headaches  [] positional headache  [x] non-positional    Sleep habits: unrefreshing sleep  Caffeine intake: 1  Gyn status: having periods, no estrogen    HIT 6: 62    Current acute treatment:  Ibuprofen - 2 days per week    Current prevention:  Wellbutrin  Trintellix  Propranolol  Botox - for facial spasm and paralysis   Lamotrigine    Previously tried/failed acute treatment:  Vistaril - allergy  Sumatriptan  Nurtec - had sample, helped    Previously tried/failed preventative treatment:  Sertraline     Review of patient's allergies indicates:   Allergen Reactions    Aspirin Shortness Of Breath    Sulfamethoxazole-trimethoprim Shortness Of Breath    Vistaril [hydroxyzine pamoate] Shortness Of Breath    Gadolinium-containing contrast media Rash     Mild localized rash in chest area; denied (-) itchiness and (-) shortness of breath  Mild localized rash in chest area; denied (-) itchiness and (-) shortness of breath       Current Outpatient Medications   Medication Sig Dispense Refill    AUVELITY  mg TbIE       [START ON 8/26/2024] galcanezumab-gnlm (EMGALITY PEN) 120 mg/mL PnIj Inject 1 mL (120 mg total) into the skin every 28 days. 1 mL 11    galcanezumab-gnlm 120 mg/mL PnIj Inject 240 mg (2 injections) subcutaneous at separate sites, once (loading dose). Start maintenance dose 28 days later 2 mL 0    lamoTRIgine (LAMICTAL) 100 MG tablet       lamoTRIgine (LAMICTAL) 25 MG tablet Take by mouth.      levomefolate calcium (ELFOLATE) 15 mg Tab       naltrexone (DEPADE) 50 mg tablet Take 50 mg by mouth.      onabotulinumtoxinA (BOTOX INJ) Inject as directed. Every 3 months facial area      ondansetron (ZOFRAN-ODT) 4 MG TbDL Take 1 tablet (4 mg total) by mouth every 6 (six) hours as needed (nausea). 30 tablet 11    propranoloL (INDERAL) 10 MG tablet       TRINTELLIX 5 mg Tab       ubrogepant (UBRELVY) 100 mg tablet Take 1 tablet by mouth as needed for migraine. May repeat in 2 hours if needed. Max 2 tablets per day 16 tablet 11    WELLBUTRIN  mg TBSR 12 hr tablet         No current facility-administered medications for this visit.       Past Medical History  Past Medical History:   Diagnosis Date    Allergy     Breast disorder     Depression     GERD (gastroesophageal reflux disease)        Past Surgical History  Past Surgical History:   Procedure Laterality Date     SECTION      CRANIOTOMY  2021    micro decompression of facial nerve    LAPAROSCOPIC CHOLECYSTECTOMY N/A 2022    Procedure: CHOLECYSTECTOMY, LAPAROSCOPIC;  Surgeon: Raman Camargo MD;  Location: [facility];  Service: General;  Laterality: N/A;       Family History  Family History   Problem Relation Name Age of Onset    Cancer Mother          leukemia    Migraines Mother      Hypertension Father         Social History  Social History     Socioeconomic History    Marital status:    Tobacco Use    Smoking status: Never    Smokeless tobacco: Never   Substance and Sexual Activity    Alcohol use: Yes     Comment: social    Drug use: No     Social Determinants of Health     Financial Resource Strain: Low Risk  (2024)    Overall Financial Resource Strain (CARDIA)     Difficulty of Paying Living Expenses: Not hard at all   Food Insecurity: No Food Insecurity (2024)    Hunger Vital Sign     Worried About Running Out of Food in the Last Year: Never true     Ran Out of Food in the Last Year: Never true   Transportation Needs: No Transportation Needs (6/15/2020)    Received from [facility], [facility]    PRAPARE - Transportation     Lack of Transportation (Medical): No     Lack of Transportation (Non-Medical): No   Physical Activity: Inactive (2024)    Exercise Vital Sign     Days of Exercise per Week: 0 days     Minutes of Exercise per Session: 0 min   Stress: Stress Concern Present (2024)    [employer] -  Occupational Stress Questionnaire     Feeling of Stress : Very much   Housing Stability: Unknown (7/24/2024)    Housing Stability Vital Sign     Unable to Pay for Housing in the Last Year: No        Review of Systems  14-point review of systems as follows:   No check ruthann indicates NEGATIVE response   Constitutional: [x] weight loss, [] change to appetite   Eyes: [] change in vision, [] double vision   Ears, nose, mouth, throat: [] frequent nose bleeds, [x] ringing in the ears   Respiratory: [] cough, [] wheezing   Cardiovascular: [] chest pain, [] palpitations   Gastrointestinal: [] jaundice, [] nausea/vomiting   Genitourinary: [] incontinence, [] burning with urination   Hematologic/lymphatic: [x] easy bruising/bleeding, [] night sweats   Neurological: [x] numbness, [] weakness   Endocrine: [x] fatigue, [x] heat/cold intolerance   Allergy/Immunologic: [] fevers, [] chills   Musculoskeletal: [x] muscle pain, [] joint pain   Psychiatric: [] thoughts of harming self/others, [x] depression   Integumentary: [] rashes, [] sores that do not heal     Objective:        There were no vitals filed for this visit.  There is no height or weight on file to calculate BMI.    7/26/24  Constitutional: appears in no acute distress, well-developed, well-nourished     Eyes: normal conjunctiva, PERRLA    Ears, nose, mouth, throat: external appearance of ears and nose normal, hearing intact     Cardiovascular: regular rate and rhythm, no murmurs appreciated    Respiratory: unlabored respirations, breath sounds normal bilaterally    Gastrointestinal: no visible abdominal masses, no guarding, no visible hernia    Musculoskeletal: normal tone in all four extremities. No abnormal movements. No pronator drift. No orbit. Symmetric finger tapping. Normal station. Normal regular gait. Unsteady but able to complete tandem gait.      Spine:   CERVICAL SPINE:  ROM: normal   MUSCLE SPASM: mild bilateral   FACET LOADING: mild bilateral    SPURLING:  no  MURIEL / TERESA tender: no     Psychiatric: normal judgment and insight. Oriented to person, place, and time.     Neurologic:   Cortical functions: recent and remote memory intact, normal attention span and concentration, speech fluent, adequate fund of knowledge   Cranial nerves: visual fields full, PERRLA, EOMI, asymmetric facial strength (left brow weakness), hearing intact, palate elevates symmetrically, shoulder shrug 5/5, tongue protrudes midline   Reflexes: 2+ in the upper and lower extremities, no King  Coordination: normal finger to nose, heel to shin    Data Review:     I have personally reviewed the referring provider's notes, labs, & imaging made available to me today.      RADIOLOGY STUDIES:  I have personally reviewed the pertinent images performed.       Results for orders placed or performed during the hospital encounter of 06/13/24   MRI Brain W WO Contrast    Narrative    EXAMINATION:  MRI BRAIN W WO CONTRAST    CLINICAL HISTORY:  Memory loss;Transient ischemic attack (TIA);    TECHNIQUE:  Multiplanar MR imaging of the brain was performed both before and after IV administration of 10 cc gadolinium    COMPARISON:  None.    FINDINGS:  No acute infarct, mass effect or hemorrhage.  Two punctate FLAIR hyperintense subcortical frontal lobe foci are nonspecific but may be related to early small vessel ischemic disease.  Meningeal and parenchymal enhancement pattern is normal.  Ventricles and sulci are proportionate.    No abnormal extra-axial fluid collections.    Flow voids are maintained in the intracranial arteries and dural venous sinuses.    Calvarium has a normal signal.  Mucosal thickening is in the paranasal sinuses.      Impression    No acute findings in the brain or abnormal enhancement      Electronically signed by: Laurent Russell MD  Date:    06/13/2024  Time:    20:56   CT Head Without Contrast    Narrative    EXAMINATION:  CT HEAD WITHOUT CONTRAST    CLINICAL HISTORY:  Neurological  deficit.    TECHNIQUE:  Axial CT images were obtained of the brain without intravenous contrast.  Coronal and sagittal reformations were obtained.  Automated exposure control utilized to reduce radiation dose.  Total exam DLP is 1139 mGy cm.    COMPARISON:  None.    FINDINGS:  Gray-white matter differentiation is within normal limits.   No acute intracranial hemorrhage, extra-axial fluid collection, hydrocephalus, mass effect, or midline shift is noted.  No large vessel territory acute ischemia is identified.  Visualized paranasal sinuses demonstrate scattered mucoperiosteal thickening.  Visualized mastoid air cells are clear.  Left occipital craniectomy changes noted and mastoidectomy changes noted.  No acute displaced calvarial fracture is identified.      Impression    1. No acute intracranial abnormalities identified.      Electronically signed by: Christopher Engel MD  Date:    06/13/2024  Time:    19:28       Lab Results   Component Value Date     06/13/2024    K 4.5 06/13/2024    MG 2.2 06/13/2024     06/13/2024    CO2 26 06/13/2024    BUN 12 06/13/2024    CREATININE 0.75 06/13/2024    GLU 87 06/13/2024    HGBA1C 4.9 10/02/2023    AST 27 06/13/2024    ALT 22 06/13/2024    ALBUMIN 4.5 06/13/2024    PROT 7.4 06/13/2024    BILITOT 0.4 06/13/2024    CHOL 160 10/02/2023    CHOL 143 01/25/2016    HDL 74 10/02/2023    HDL 76 01/25/2016    LDLCALC 70 10/02/2023    LDLCALC 57 01/25/2016    TRIG 84 10/02/2023    TRIG 50 01/25/2016       Lab Results   Component Value Date    WBC 6.54 06/13/2024    HGB 13.4 06/13/2024    HCT 39.5 06/13/2024    MCV 94 06/13/2024     06/13/2024       Lab Results   Component Value Date    TSH 2.380 06/13/2024           Assessment & Plan:       Problem List Items Addressed This Visit          Neuro    Intractable chronic migraine without aura and without status migrainosus    Overview     Migraine headaches since at least her 30's with strong family history. Headaches are  typically unilateral, moderate to severe in intensity, worsen with activity, and associated with sensitivity to light and sound. Recent brain MRI unremarkable.   We discussed optimizing prevention as she has a daily headache with at least 10 escalations per month. Discussed added CGRP for prevention as she gets Botox for facial paralysis already. Avoid Aimovig and Qulipta due to underlying constipation. Galcanezumab (Emgality) treatment was approved for the prevention of acute and chronic migraine on 9/27/2018. The patient will be an ideal candidate for Emgality. We are planning for 3 treatments 28 days apart. If we see no improvement after 3 treatments, we will discontinue the injections.   For acute attacks, avoid triptans due to possible hemiplegic migraines. Add gepant.            Relevant Medications    galcanezumab-gnlm 120 mg/mL PnIj    galcanezumab-gnlm (EMGALITY PEN) 120 mg/mL PnIj (Start on 8/26/2024)    ubrogepant (UBRELVY) 100 mg tablet     Other Visit Diagnoses       Anesthesia of skin    -  Primary    Relevant Orders    MRI Cervical Spine W WO Cont    Intractable hemiplegic migraine without status migrainosus        Relevant Orders    Echo Saline Bubble? Yes    Transient neurological symptoms        Relevant Orders    Echo Saline Bubble? Yes    Nausea        Relevant Medications    ondansetron (ZOFRAN-ODT) 4 MG TbDL                Please call our clinic at [phone] or send a message on the [employer] portal if there are any changes to the plan described below, for example,if you are not contacted for the requested tests, referral(s) within one week, if you are unable to receive the medications prescribed, or if you feel you need to change the treatment course for any reason.     TESTING:  -- cervical MRI  -- ECHO    REFERRALS:  -- none     PREVENTION (use daily regardless of headache):  -- Start Emgality injection once every 28 days (2 prescriptions, first month give yourself TWO shots, all other  months give yourself ONE shot) (will come from [facility], they will call you)  -- start magnesium in ONE of the following preparations -               1. Magnesium oxide 800mg daily (the most common over the counter kind, may causes loose stools)              2. Magnesium citrate 400-500mg daily (harder to find, but more neutral on the bowels)              3. Magnesium glycinate 400mg daily (hardest to find, look online, but most bowel-neutral, best absorbed)     AS-NEEDED TREATMENT (use total no more than 10 days per month unless otherwise stated):  -- START Ubrelvy with next headache escalation. You can repeat two hours later if needed. With this medication do not drink grapefruit juice or eat grapefruit or some medications like ketoconazole, itraconazole, or antibiotics clarithromycin  -- START Zofran for nausea     Follow up in about 3 months (around 10/26/2024).    Tg Phillips NP

## 2024-07-26 NOTE — PATIENT INSTRUCTIONS
Please call our clinic at [phone] or send a message on the [employer] portal if there are any changes to the plan described below, for example,if you are not contacted for the requested tests, referral(s) within one week, if you are unable to receive the medications prescribed, or if you feel you need to change the treatment course for any reason.     TESTING:  -- cervical MRI  -- ECHO    REFERRALS:  -- none     PREVENTION (use daily regardless of headache):  -- Start Emgality injection once every 28 days (2 prescriptions, first month give yourself TWO shots, all other months give yourself ONE shot) (will come from [facility], they will call you)  -- start magnesium in ONE of the following preparations -               1. Magnesium oxide 800mg daily (the most common over the counter kind, may causes loose stools)              2. Magnesium citrate 400-500mg daily (harder to find, but more neutral on the bowels)              3. Magnesium glycinate 400mg daily (hardest to find, look online, but most bowel-neutral, best absorbed)     AS-NEEDED TREATMENT (use total no more than 10 days per month unless otherwise stated):  -- START Ubrelvy with next headache escalation. You can repeat two hours later if needed. With this medication do not drink grapefruit juice or eat grapefruit or some medications like ketoconazole, itraconazole, or antibiotics clarithromycin  -- START Zofran for nausea

## 2024-08-10 ENCOUNTER — PATIENT MESSAGE (OUTPATIENT)
Dept: OBSTETRICS AND GYNECOLOGY | Facility: CLINIC | Age: 44
End: 2024-08-10
Payer: COMMERCIAL

## 2024-08-12 ENCOUNTER — CLINICAL SUPPORT (OUTPATIENT)
Dept: OBSTETRICS AND GYNECOLOGY | Facility: CLINIC | Age: 44
End: 2024-08-12
Payer: COMMERCIAL

## 2024-08-12 DIAGNOSIS — Z23 ENCOUNTER FOR ADMINISTRATION OF VACCINE: Primary | ICD-10-CM

## 2024-08-12 PROCEDURE — 90651 9VHPV VACCINE 2/3 DOSE IM: CPT | Mod: S$GLB,,, | Performed by: OBSTETRICS & GYNECOLOGY

## 2024-08-12 PROCEDURE — 90471 IMMUNIZATION ADMIN: CPT | Mod: S$GLB,,, | Performed by: OBSTETRICS & GYNECOLOGY

## 2024-08-12 PROCEDURE — 99999 PR PBB SHADOW E&M-EST. PATIENT-LVL I: CPT | Mod: PBBFAC,,,

## 2024-08-12 NOTE — PROGRESS NOTES
Patient presented to clinic for HPV vaccine.  Name, , and allergies verified and reviewed.  Vaccine was administered and no complications seen.

## 2024-08-19 ENCOUNTER — HOSPITAL ENCOUNTER (OUTPATIENT)
Dept: RADIOLOGY | Facility: HOSPITAL | Age: 44
Discharge: HOME | End: 2024-08-19
Attending: NURSE PRACTITIONER
Payer: COMMERCIAL

## 2024-08-19 ENCOUNTER — HOSPITAL ENCOUNTER (OUTPATIENT)
Dept: CARDIOLOGY | Facility: HOSPITAL | Age: 44
Discharge: HOME | End: 2024-08-19
Attending: NURSE PRACTITIONER
Payer: COMMERCIAL

## 2024-08-19 VITALS — WEIGHT: 114 LBS | HEIGHT: 65 IN | BODY MASS INDEX: 18.99 KG/M2

## 2024-08-19 DIAGNOSIS — G43.419 INTRACTABLE HEMIPLEGIC MIGRAINE WITHOUT STATUS MIGRAINOSUS: ICD-10-CM

## 2024-08-19 DIAGNOSIS — R93.1 ABNORMAL ECHOCARDIOGRAM: ICD-10-CM

## 2024-08-19 DIAGNOSIS — G43.419 INTRACTABLE HEMIPLEGIC MIGRAINE WITHOUT STATUS MIGRAINOSUS: Primary | ICD-10-CM

## 2024-08-19 DIAGNOSIS — R20.0 ANESTHESIA OF SKIN: ICD-10-CM

## 2024-08-19 DIAGNOSIS — R29.818 TRANSIENT NEUROLOGICAL SYMPTOMS: ICD-10-CM

## 2024-08-19 LAB
ASCENDING AORTA: 2.57 CM
AV INDEX (PROSTH): 0.92
AV MEAN GRADIENT: 3 MMHG
AV PEAK GRADIENT: 5 MMHG
AV VALVE AREA BY VELOCITY RATIO: 2.26 CM²
AV VALVE AREA: 2.49 CM²
AV VELOCITY RATIO: 0.83
BSA FOR ECHO PROCEDURE: 1.54 M2
CV ECHO LV RWT: 0.31 CM
DOP CALC AO PEAK VEL: 1.13 M/S
DOP CALC AO VTI: 21.3 CM
DOP CALC LVOT AREA: 2.7 CM2
DOP CALC LVOT DIAMETER: 1.86 CM
DOP CALC LVOT PEAK VEL: 0.94 M/S
DOP CALC LVOT STROKE VOLUME: 52.96 CM3
DOP CALCLVOT PEAK VEL VTI: 19.5 CM
E WAVE DECELERATION TIME: 226.96 MSEC
E/A RATIO: 1.21
E/E' RATIO: 6.62 M/S
ECHO LV POSTERIOR WALL: 0.61 CM (ref 0.6–1.1)
EJECTION FRACTION: 60 %
FRACTIONAL SHORTENING: 39 % (ref 28–44)
INTERVENTRICULAR SEPTUM: 0.56 CM (ref 0.6–1.1)
IVRT: 78.02 MSEC
LEFT ATRIUM AREA SYSTOLIC (APICAL 2 CHAMBER): 13.09 CM2
LEFT ATRIUM AREA SYSTOLIC (APICAL 4 CHAMBER): 9.07 CM2
LEFT ATRIUM SIZE: 2.34 CM
LEFT ATRIUM VOLUME INDEX MOD: 17.1 ML/M2
LEFT ATRIUM VOLUME MOD: 26.68 CM3
LEFT INTERNAL DIMENSION IN SYSTOLE: 2.42 CM (ref 2.1–4)
LEFT VENTRICLE DIASTOLIC VOLUME INDEX: 43.71 ML/M2
LEFT VENTRICLE DIASTOLIC VOLUME: 68.18 ML
LEFT VENTRICLE END SYSTOLIC VOLUME APICAL 2 CHAMBER: 32.09 ML
LEFT VENTRICLE END SYSTOLIC VOLUME APICAL 4 CHAMBER: 17.96 ML
LEFT VENTRICLE MASS INDEX: 39 G/M2
LEFT VENTRICLE SYSTOLIC VOLUME INDEX: 13.2 ML/M2
LEFT VENTRICLE SYSTOLIC VOLUME: 20.62 ML
LEFT VENTRICULAR INTERNAL DIMENSION IN DIASTOLE: 3.96 CM (ref 3.5–6)
LEFT VENTRICULAR MASS: 61.26 G
LV LATERAL E/E' RATIO: 5.73 M/S
LV SEPTAL E/E' RATIO: 7.82 M/S
LVED V (TEICH): 68.18 ML
LVES V (TEICH): 20.62 ML
LVOT MG: 2.15 MMHG
LVOT MV: 0.71 CM/S
MV PEAK A VEL: 0.71 M/S
MV PEAK E VEL: 0.86 M/S
MV STENOSIS PRESSURE HALF TIME: 65.82 MS
MV VALVE AREA P 1/2 METHOD: 3.34 CM2
OHS CV RV/LV RATIO: 0.9 CM
PISA TR MAX VEL: 2.38 M/S
PULM VEIN S/D RATIO: 0.85
PV PEAK D VEL: 0.55 M/S
PV PEAK S VEL: 0.47 M/S
RA PRESSURE ESTIMATED: 3 MMHG
RA VOL SYS: 30.73 ML
RIGHT ATRIAL AREA: 11.3 CM2
RIGHT ATRIUM VOLUME AREA LENGTH APICAL 4 CHAMBER: 27.33 ML
RIGHT VENTRICLE DIASTOLIC LENGTH: 6.9 CM
RIGHT VENTRICLE DIASTOLIC MID DIMENSION: 2.1 CM
RIGHT VENTRICULAR END-DIASTOLIC DIMENSION: 3.55 CM
RIGHT VENTRICULAR LENGTH IN DIASTOLE (APICAL 4-CHAMBER VIEW): 6.89 CM
RV MID DIAMA: 2.06 CM
RV TB RVSP: 5 MMHG
RV TISSUE DOPPLER FREE WALL SYSTOLIC VELOCITY 1 (APICAL 4 CHAMBER VIEW): 13.32 CM/S
SINUS: 2.83 CM
STJ: 2.63 CM
TDI LATERAL: 0.15 M/S
TDI SEPTAL: 0.11 M/S
TDI: 0.13 M/S
TR MAX PG: 23 MMHG
TRICUSPID ANNULAR PLANE SYSTOLIC EXCURSION: 2.14 CM
TV REST PULMONARY ARTERY PRESSURE: 26 MMHG
Z-SCORE OF LEFT VENTRICULAR DIMENSION IN END DIASTOLE: -1.28
Z-SCORE OF LEFT VENTRICULAR DIMENSION IN END SYSTOLE: -1.13

## 2024-08-19 PROCEDURE — 93306 TTE W/DOPPLER COMPLETE: CPT | Mod: PO

## 2024-08-19 PROCEDURE — 72156 MRI NECK SPINE W/O & W/DYE: CPT | Mod: 26,,, | Performed by: RADIOLOGY

## 2024-08-19 PROCEDURE — 93306 TTE W/DOPPLER COMPLETE: CPT | Mod: 26,,, | Performed by: INTERNAL MEDICINE

## 2024-08-19 PROCEDURE — 25500020 PHARM REV CODE 255: Mod: PO | Performed by: NURSE PRACTITIONER

## 2024-08-19 PROCEDURE — A9585 GADOBUTROL INJECTION: HCPCS | Mod: PO | Performed by: NURSE PRACTITIONER

## 2024-08-19 PROCEDURE — 72156 MRI NECK SPINE W/O & W/DYE: CPT | Mod: TC,PO

## 2024-08-19 RX ORDER — GADOBUTROL 604.72 MG/ML
5 INJECTION INTRAVENOUS
Status: COMPLETED | OUTPATIENT
Start: 2024-08-19 | End: 2024-08-19

## 2024-08-19 RX ADMIN — GADOBUTROL 5 ML: 604.72 INJECTION INTRAVENOUS at 10:08

## 2024-08-19 NOTE — NURSING NOTE
Bubble study procedure explained. 24g IV started in the right AC, flushed and secured. 30ml of agitated saline injected into IV. IV left in for MRI. Study complete.

## 2024-09-22 ENCOUNTER — PATIENT MESSAGE (OUTPATIENT)
Dept: NEUROLOGY | Facility: CLINIC | Age: 44
End: 2024-09-22
Payer: COMMERCIAL

## 2024-09-22 DIAGNOSIS — G43.719 INTRACTABLE CHRONIC MIGRAINE WITHOUT AURA AND WITHOUT STATUS MIGRAINOSUS: ICD-10-CM

## 2024-09-22 DIAGNOSIS — G43.419 INTRACTABLE HEMIPLEGIC MIGRAINE WITHOUT STATUS MIGRAINOSUS: Primary | ICD-10-CM

## 2024-09-23 NOTE — TELEPHONE ENCOUNTER
Per PA department--insurance denied the medication because when the PA was originally submitted they did not do a loading dose separate. So the insurance only allowed max 3 mL in 75 days. They called to explain but there is no override allowed. It would need to be appealed. Also med will be able to be filled on 10/9/24. States that appeal may bot be completed by then.

## 2024-09-24 RX ORDER — RIMEGEPANT SULFATE 75 MG/75MG
75 TABLET, ORALLY DISINTEGRATING ORAL DAILY PRN
Qty: 9 TABLET | Refills: 11 | Status: SHIPPED | OUTPATIENT
Start: 2024-09-24

## 2024-09-27 ENCOUNTER — OFFICE VISIT (OUTPATIENT)
Dept: CARDIOLOGY | Facility: CLINIC | Age: 44
End: 2024-09-27
Payer: COMMERCIAL

## 2024-09-27 ENCOUNTER — PATIENT MESSAGE (OUTPATIENT)
Dept: CARDIOLOGY | Facility: CLINIC | Age: 44
End: 2024-09-27

## 2024-09-27 VITALS
HEIGHT: 65 IN | SYSTOLIC BLOOD PRESSURE: 111 MMHG | BODY MASS INDEX: 18.52 KG/M2 | DIASTOLIC BLOOD PRESSURE: 72 MMHG | HEART RATE: 76 BPM | WEIGHT: 111.13 LBS

## 2024-09-27 DIAGNOSIS — G43.419 INTRACTABLE HEMIPLEGIC MIGRAINE WITHOUT STATUS MIGRAINOSUS: Chronic | ICD-10-CM

## 2024-09-27 DIAGNOSIS — R93.1 ABNORMAL ECHOCARDIOGRAM: Primary | Chronic | ICD-10-CM

## 2024-09-27 DIAGNOSIS — Q21.12 PFO (PATENT FORAMEN OVALE): ICD-10-CM

## 2024-09-27 PROCEDURE — 99999 PR PBB SHADOW E&M-EST. PATIENT-LVL V: CPT | Mod: PBBFAC,,, | Performed by: INTERNAL MEDICINE

## 2024-09-27 PROCEDURE — 1159F MED LIST DOCD IN RCRD: CPT | Mod: CPTII,S$GLB,, | Performed by: INTERNAL MEDICINE

## 2024-09-27 PROCEDURE — 3008F BODY MASS INDEX DOCD: CPT | Mod: CPTII,S$GLB,, | Performed by: INTERNAL MEDICINE

## 2024-09-27 PROCEDURE — 3074F SYST BP LT 130 MM HG: CPT | Mod: CPTII,S$GLB,, | Performed by: INTERNAL MEDICINE

## 2024-09-27 PROCEDURE — 3078F DIAST BP <80 MM HG: CPT | Mod: CPTII,S$GLB,, | Performed by: INTERNAL MEDICINE

## 2024-09-27 PROCEDURE — 99204 OFFICE O/P NEW MOD 45 MIN: CPT | Mod: S$GLB,,, | Performed by: INTERNAL MEDICINE

## 2024-09-27 RX ORDER — SODIUM CHLORIDE 9 MG/ML
INJECTION, SOLUTION INTRAVENOUS CONTINUOUS
OUTPATIENT
Start: 2024-09-27

## 2024-09-27 RX ORDER — MUPIROCIN 20 MG/G
OINTMENT TOPICAL
OUTPATIENT
Start: 2024-09-27

## 2024-09-27 NOTE — PROGRESS NOTES
Subjective:    Patient ID:  Zahira Kovacs is a 43 y.o. female who presents for [facility]        HPI  NEW PATIENT EVALUATION RECENT ECHO WITH SHUNT, EKG WITHIN NORMAL LIMITS, RECENT NEGATIVE MRI PER NEURO FOR HA, NO SOB, NO CP, SEE ROS   Left Ventricle: The left ventricle is normal in size. Normal wall thickness. There is normal systolic function. Ejection fraction by visual approximation is 60%. There is normal diastolic function.    Right Ventricle: Normal right ventricular cavity size. Wall thickness is normal. Systolic function is normal.    Left Atrium: Agitated saline study of the atrial septum is positive, intracardiac shunt at atrial level based on imaging with Valsalva.    Aortic Valve: The aortic valve is a trileaflet valve.    Pulmonary Artery: The estimated pulmonary artery systolic pressure is 26 mmHg.    IVC/SVC: Normal venous pressure at 3 mmHg.  Past Medical History:   Diagnosis Date    Allergy     Breast disorder     Depression     GERD (gastroesophageal reflux disease)      Past Surgical History:   Procedure Laterality Date     SECTION      CRANIOTOMY  2021    micro decompression of facial nerve    LAPAROSCOPIC CHOLECYSTECTOMY N/A 2022    Procedure: CHOLECYSTECTOMY, LAPAROSCOPIC;  Surgeon: Raman Camargo MD;  Location: [facility];  Service: General;  Laterality: N/A;     Family History   Problem Relation Name Age of Onset    Cancer Mother          leukemia    Migraines Mother      Hypertension Father       Social History     Socioeconomic History    Marital status:    Tobacco Use    Smoking status: Never    Smokeless tobacco: Never   Substance and Sexual Activity    Alcohol use: Yes     Comment: social    Drug use: No     Social Determinants of Health     Financial Resource Strain: Low Risk  (2024)    Overall Financial Resource Strain (CARDIA)     Difficulty of Paying Living Expenses: Not hard at all   Food Insecurity: No Food Insecurity (2024)    Hunger  Vital Sign     Worried About Running Out of Food in the Last Year: Never true     Ran Out of Food in the Last Year: Never true   Transportation Needs: No Transportation Needs (6/15/2020)    [facility]    PRAPARE - Transportation     Lack of Transportation (Medical): No     Lack of Transportation (Non-Medical): No   Physical Activity: Inactive (7/24/2024)    Exercise Vital Sign     Days of Exercise per Week: 0 days     Minutes of Exercise per Session: 0 min   Stress: Stress Concern Present (7/24/2024)    [employer] - Occupational Stress Questionnaire     Feeling of Stress : Very much   Housing Stability: Unknown (7/24/2024)    Housing Stability Vital Sign     Unable to Pay for Housing in the Last Year: No       Review of patient's allergies indicates:   Allergen Reactions    Aspirin Shortness Of Breath    Sulfamethoxazole-trimethoprim Shortness Of Breath    Vistaril [hydroxyzine pamoate] Shortness Of Breath    Gadolinium-containing contrast media Rash     Mild localized rash in chest area; denied (-) itchiness and (-) shortness of breath  Mild localized rash in chest area; denied (-) itchiness and (-) shortness of breath         Current Outpatient Medications:     AUVELITY  mg TbIE, , Disp: , Rfl:     galcanezumab-gnlm (EMGALITY PEN) 120 mg/mL PnIj, Inject 1 mL (120 mg total) into the skin every 28 days., Disp: 1 mL, Rfl: 11    galcanezumab-gnlm 120 mg/mL PnIj, Inject 240 mg (2 injections) subcutaneous at separate sites, once (loading dose). Start maintenance dose 28 days later, Disp: 2 mL, Rfl: 0    lamoTRIgine (LAMICTAL) 100 MG tablet, , Disp: , Rfl:     lamoTRIgine (LAMICTAL) 25 MG tablet, Take by mouth., Disp: , Rfl:     levomefolate calcium (ELFOLATE) 15 mg Tab, , Disp: , Rfl:     naltrexone (DEPADE) 50  mg tablet, Take 50 mg by mouth., Disp: , Rfl:     onabotulinumtoxinA (BOTOX INJ), Inject as directed. Every 3 months facial area, Disp: , Rfl:     ondansetron (ZOFRAN-ODT) 4 MG TbDL, Take 1 tablet (4 mg total) by mouth every 6 (six) hours as needed (nausea)., Disp: 30 tablet, Rfl: 11    propranoloL (INDERAL) 10 MG tablet, , Disp: , Rfl:     rimegepant (NURTEC) 75 mg odt, Take 1 tablet (75 mg total) by mouth daily as needed for Migraine. Place ODT tablet on the tongue; alternatively the ODT tablet may be placed under the tongue, Disp: 9 tablet, Rfl: 11    WELLBUTRIN  mg TBSR 12 hr tablet, , Disp: , Rfl:     TRINTELLIX 5 mg Tab, , Disp: , Rfl:     Review of Systems   Constitutional: Negative for chills, diaphoresis, malaise/fatigue and night sweats.   HENT:  Negative for congestion, hearing loss and nosebleeds.    Eyes:  Negative for blurred vision and visual disturbance.   Cardiovascular:  Negative for chest pain, claudication, cyanosis, dyspnea on exertion, irregular heartbeat, leg swelling, near-syncope, orthopnea, palpitations, paroxysmal nocturnal dyspnea and syncope.   Respiratory:  Negative for cough, hemoptysis, shortness of breath, sleep disturbances due to breathing, sputum production and wheezing.    Endocrine: Negative for cold intolerance, heat intolerance and polyuria.   Hematologic/Lymphatic: Negative for adenopathy. Does not bruise/bleed easily.   Skin:  Negative for color change, itching and nail changes.   Musculoskeletal:  Negative for back pain, falls and joint pain.   Gastrointestinal:  Negative for abdominal pain, change in bowel habit, dysphagia, flatus, heartburn, hematemesis, jaundice, melena and nausea.   Genitourinary:  Negative for dysuria, flank pain and frequency.   Neurological:  Positive for headaches (CHRONIC). Negative for brief paralysis, light-headedness, loss of balance, numbness, paresthesias, seizures, sensory change, tremors and weakness.   Psychiatric/Behavioral:   "Negative for altered mental status, depression and memory loss. The patient is not nervous/anxious.    Allergic/Immunologic: Negative for persistent infections.        Objective:      Vitals:    09/27/24 1152   BP: 111/72   Pulse: 76   Weight: 50.4 kg (111 lb 1.8 oz)   Height: 5' 5" (1.651 m)   PainSc: 0-No pain     Body mass index is 18.49 kg/m².    Physical Exam  Constitutional:       Appearance: She is underweight.   HENT:      Head: Normocephalic and atraumatic.   Eyes:      Conjunctiva/sclera: Conjunctivae normal.   Neck:      Vascular: Normal carotid pulses. No carotid bruit or JVD.   Cardiovascular:      Rate and Rhythm: Normal rate and regular rhythm. No extrasystoles are present.     Pulses: Normal pulses.           Carotid pulses are 2+ on the right side and 2+ on the left side.       Radial pulses are 2+ on the right side and 2+ on the left side.        Femoral pulses are 2+ on the right side and 2+ on the left side.       Posterior tibial pulses are 2+ on the right side and 2+ on the left side.      Heart sounds: Murmur heard.      Systolic murmur is present with a grade of 1/6 at the upper left sternal border.      No friction rub. No S4 sounds.   Musculoskeletal:      Cervical back: Neck supple.   Neurological:      Mental Status: She is alert.   Psychiatric:         Mood and Affect: Mood normal.         Speech: Speech normal.         Behavior: Behavior normal.               ..    Chemistry        Component Value Date/Time     06/13/2024 1640    K 4.5 06/13/2024 1640     06/13/2024 1640    CO2 26 06/13/2024 1640    BUN 12 06/13/2024 1640    CREATININE 0.75 06/13/2024 1640    GLU 87 06/13/2024 1640        Component Value Date/Time    CALCIUM 9.8 06/13/2024 1640    ALKPHOS 59 06/13/2024 1640    AST 27 06/13/2024 1640    ALT 22 06/13/2024 1640    BILITOT 0.4 06/13/2024 1640    EGFRNONAA 87 01/25/2016 0927            ..  Lab Results   Component Value Date    CHOL 160 10/02/2023    CHOL 143 " "01/25/2016     Lab Results   Component Value Date    HDL 74 10/02/2023    HDL 76 01/25/2016     Lab Results   Component Value Date    LDLCALC 70 10/02/2023    LDLCALC 57 01/25/2016     Lab Results   Component Value Date    TRIG 84 10/02/2023    TRIG 50 01/25/2016     No results found for: "CHOLHDL"  ..  Lab Results   Component Value Date    WBC 6.54 06/13/2024    HGB 13.4 06/13/2024    HCT 39.5 06/13/2024    MCV 94 06/13/2024     06/13/2024       Test(s) Reviewed  I have reviewed the following in detail:  [] Stress test   [] Angiography   [] Echocardiogram   [] Labs   [] Other:       Assessment:         ICD-10-CM ICD-9-CM   1. Abnormal echocardiogram  R93.1 793.2   2. PFO (patent foramen ovale)  Q21.12 745.5   3. Intractable hemiplegic migraine without status migrainosus  G43.419 346.31     Problem List Items Addressed This Visit          Neuro    Intractable hemiplegic migraine without status migrainosus       Cardiac/Vascular    Abnormal echocardiogram - Primary    PFO (patent foramen ovale)        Plan:     BERNARDO TO ASSESS PFO DOUBT RELATION TO MIGRAINE HEADACHE, NEED TO CHARACTERIZE PFO OR SHUNT, DISCUSSED AT LENGTH RISK AND ALTERNATIVE EXPLAINED PATIENT DETAILS INFORMED CONSENT WAS OBTAINED, NO ANGINA NO OVERT HEART FAILURE NO CLINICAL ARRHYTHMIA RETURN TO CLINIC IN FEW WEEKS AFTER TESTS      Abnormal echocardiogram  -     Ambulatory referral/consult to Cardiology    PFO (patent foramen ovale)    Intractable hemiplegic migraine without status migrainosus  -     Ambulatory referral/consult to Cardiology    RTC Low level/low impact aerobic exercise 5x's/wk. Heart healthy diet and risk factor modification.    See labs and med orders.    Aerobic exercise 5x's/wk. Heart healthy diet and risk factor modification.    See labs and med orders.        "

## 2024-09-27 NOTE — PATIENT INSTRUCTIONS
BERNARDO    Arrive for your procedure at:  [facility] on October 14th at 12pm.       FASTING:  You MAY NOT have anything to eat or drink AFTER MIDNIGHT.  If your procedure is scheduled in the afternoon, you may have a LIGHT BREAKFAST BEFORE 6:00 A.M.  For example: Two slices of toast; black coffee or black tea.    MEDICATIONS:  You may take your regular morning medications with a small sip of water.      Continue: All meds    Please refer to pre-op instructions received from [facility]. YOU WILL NEED SOMEONE TO DRIVE YOU HOME.

## 2024-10-11 ENCOUNTER — ANESTHESIA EVENT (OUTPATIENT)
Dept: ENDOSCOPY | Facility: HOSPITAL | Age: 44
End: 2024-10-11
Payer: COMMERCIAL

## 2024-10-14 ENCOUNTER — HOSPITAL ENCOUNTER (OUTPATIENT)
Dept: CARDIOLOGY | Facility: HOSPITAL | Age: 44
Discharge: HOME | End: 2024-10-14
Attending: INTERNAL MEDICINE | Admitting: INTERNAL MEDICINE
Payer: COMMERCIAL

## 2024-10-14 ENCOUNTER — HOSPITAL ENCOUNTER (OUTPATIENT)
Facility: HOSPITAL | Age: 44
Discharge: HOME | End: 2024-10-14
Attending: INTERNAL MEDICINE | Admitting: INTERNAL MEDICINE
Payer: COMMERCIAL

## 2024-10-14 ENCOUNTER — ANESTHESIA (OUTPATIENT)
Dept: ENDOSCOPY | Facility: HOSPITAL | Age: 44
End: 2024-10-14
Payer: COMMERCIAL

## 2024-10-14 DIAGNOSIS — Q21.12 PFO (PATENT FORAMEN OVALE): ICD-10-CM

## 2024-10-14 DIAGNOSIS — R93.1 ABNORMAL ECHOCARDIOGRAM: ICD-10-CM

## 2024-10-14 DIAGNOSIS — G43.419 INTRACTABLE HEMIPLEGIC MIGRAINE WITHOUT STATUS MIGRAINOSUS: Chronic | ICD-10-CM

## 2024-10-14 DIAGNOSIS — R93.1 ABNORMAL ECHOCARDIOGRAM: Chronic | ICD-10-CM

## 2024-10-14 LAB
B-HCG UR QL: NEGATIVE
CTP QC/QA: YES
OHS QRS DURATION: 76 MS
OHS QTC CALCULATION: 403 MS

## 2024-10-14 PROCEDURE — 81025 URINE PREGNANCY TEST: CPT | Mod: PO | Performed by: INTERNAL MEDICINE

## 2024-10-14 PROCEDURE — 93325 DOPPLER ECHO COLOR FLOW MAPG: CPT | Mod: PO

## 2024-10-14 PROCEDURE — 93010 ELECTROCARDIOGRAM REPORT: CPT | Mod: ,,, | Performed by: INTERNAL MEDICINE

## 2024-10-14 PROCEDURE — 63600175 PHARM REV CODE 636 W HCPCS: Mod: PO | Performed by: NURSE ANESTHETIST, CERTIFIED REGISTERED

## 2024-10-14 PROCEDURE — 37000008 HC ANESTHESIA 1ST 15 MINUTES: Mod: PO | Performed by: INTERNAL MEDICINE

## 2024-10-14 PROCEDURE — 93312 ECHO TRANSESOPHAGEAL: CPT | Mod: 26,,, | Performed by: INTERNAL MEDICINE

## 2024-10-14 PROCEDURE — 93005 ELECTROCARDIOGRAM TRACING: CPT | Mod: 59,PO

## 2024-10-14 PROCEDURE — 37000009 HC ANESTHESIA EA ADD 15 MINS: Mod: PO | Performed by: INTERNAL MEDICINE

## 2024-10-14 PROCEDURE — 93325 DOPPLER ECHO COLOR FLOW MAPG: CPT | Mod: 26,,, | Performed by: INTERNAL MEDICINE

## 2024-10-14 RX ORDER — DIPHENHYDRAMINE HYDROCHLORIDE 50 MG/ML
25 INJECTION INTRAMUSCULAR; INTRAVENOUS EVERY 6 HOURS PRN
Status: DISCONTINUED | OUTPATIENT
Start: 2024-10-14 | End: 2024-10-14 | Stop reason: HOSPADM

## 2024-10-14 RX ORDER — PROPOFOL 10 MG/ML
VIAL (ML) INTRAVENOUS
Status: DISCONTINUED | OUTPATIENT
Start: 2024-10-14 | End: 2024-10-14

## 2024-10-14 RX ORDER — MEPERIDINE HYDROCHLORIDE 50 MG/ML
12.5 INJECTION INTRAMUSCULAR; INTRAVENOUS; SUBCUTANEOUS ONCE
Status: DISCONTINUED | OUTPATIENT
Start: 2024-10-14 | End: 2024-10-14 | Stop reason: HOSPADM

## 2024-10-14 RX ORDER — FENTANYL CITRATE 50 UG/ML
25 INJECTION, SOLUTION INTRAMUSCULAR; INTRAVENOUS EVERY 5 MIN PRN
Status: DISCONTINUED | OUTPATIENT
Start: 2024-10-14 | End: 2024-10-14 | Stop reason: HOSPADM

## 2024-10-14 RX ORDER — SODIUM CHLORIDE, SODIUM LACTATE, POTASSIUM CHLORIDE, CALCIUM CHLORIDE 600; 310; 30; 20 MG/100ML; MG/100ML; MG/100ML; MG/100ML
INJECTION, SOLUTION INTRAVENOUS CONTINUOUS
Status: DISCONTINUED | OUTPATIENT
Start: 2024-10-14 | End: 2024-10-14 | Stop reason: HOSPADM

## 2024-10-14 RX ORDER — SODIUM CHLORIDE 9 MG/ML
INJECTION, SOLUTION INTRAVENOUS CONTINUOUS
Status: DISCONTINUED | OUTPATIENT
Start: 2024-10-14 | End: 2024-10-14 | Stop reason: HOSPADM

## 2024-10-14 RX ORDER — LIDOCAINE HYDROCHLORIDE 20 MG/ML
INJECTION INTRAVENOUS
Status: DISCONTINUED | OUTPATIENT
Start: 2024-10-14 | End: 2024-10-14

## 2024-10-14 RX ORDER — LIDOCAINE HYDROCHLORIDE 10 MG/ML
1 INJECTION, SOLUTION EPIDURAL; INFILTRATION; INTRACAUDAL; PERINEURAL ONCE
Status: DISCONTINUED | OUTPATIENT
Start: 2024-10-14 | End: 2024-10-14 | Stop reason: HOSPADM

## 2024-10-14 RX ORDER — PROCHLORPERAZINE EDISYLATE 5 MG/ML
5 INJECTION INTRAMUSCULAR; INTRAVENOUS EVERY 4 HOURS PRN
Status: DISCONTINUED | OUTPATIENT
Start: 2024-10-14 | End: 2024-10-14 | Stop reason: HOSPADM

## 2024-10-14 RX ORDER — HYDROMORPHONE HYDROCHLORIDE 2 MG/ML
0.2 INJECTION, SOLUTION INTRAMUSCULAR; INTRAVENOUS; SUBCUTANEOUS EVERY 5 MIN PRN
Status: DISCONTINUED | OUTPATIENT
Start: 2024-10-14 | End: 2024-10-14 | Stop reason: HOSPADM

## 2024-10-14 RX ORDER — MUPIROCIN 20 MG/G
OINTMENT TOPICAL
Status: DISCONTINUED | OUTPATIENT
Start: 2024-10-14 | End: 2024-10-14 | Stop reason: HOSPADM

## 2024-10-14 RX ORDER — OXYCODONE HYDROCHLORIDE 5 MG/1
5 TABLET ORAL
Status: DISCONTINUED | OUTPATIENT
Start: 2024-10-14 | End: 2024-10-14 | Stop reason: HOSPADM

## 2024-10-14 RX ORDER — GLUCAGON 1 MG
1 KIT INJECTION
Status: DISCONTINUED | OUTPATIENT
Start: 2024-10-14 | End: 2024-10-14 | Stop reason: HOSPADM

## 2024-10-14 RX ORDER — SODIUM CHLORIDE 0.9 % (FLUSH) 0.9 %
10 SYRINGE (ML) INJECTION ONCE
Status: DISCONTINUED | OUTPATIENT
Start: 2024-10-14 | End: 2024-10-14 | Stop reason: HOSPADM

## 2024-10-14 RX ADMIN — PROPOFOL 100 MG: 10 INJECTION, EMULSION INTRAVENOUS at 12:10

## 2024-10-14 RX ADMIN — PROPOFOL 50 MG: 10 INJECTION, EMULSION INTRAVENOUS at 12:10

## 2024-10-14 RX ADMIN — LIDOCAINE HYDROCHLORIDE 100 MG: 20 INJECTION INTRAVENOUS at 12:10

## 2024-10-14 NOTE — ANESTHESIA PREPROCEDURE EVALUATION
10/14/2024  Zahira Kovacs is a 43 y.o., female.      Pre-op Assessment    I have reviewed the Patient Summary Reports.     I have reviewed the Nursing Notes. I have reviewed the NPO Status.   I have reviewed the Medications.     Review of Systems  Anesthesia Hx:  No problems with previous Anesthesia                Social:  Non-Smoker       Cardiovascular:      Valvular problems/Murmurs (PFO)                                       Pulmonary:  Pulmonary Normal                       Renal/:  Renal/ Normal                 Hepatic/GI:     GERD, well controlled             Neurological:      Headaches     Hemiplegic migraine without status migrainosus                              Endocrine:  Endocrine Normal            Psych:  Psychiatric History  depression              Physical Exam  General: Well nourished, Cooperative, Alert and Oriented    Airway:  Mallampati: II   Mouth Opening: Normal  TM Distance: Normal  Neck ROM: Normal ROM    Anesthesia Plan  Type of Anesthesia, risks & benefits discussed:    Anesthesia Type: Gen ETT, Gen Supraglottic Airway, Gen Natural Airway, MAC  Intra-op Monitoring Plan: Standard ASA Monitors  Post Op Pain Control Plan: multimodal analgesia  Induction:  IV  Airway Plan: Direct, Video and Fiberoptic, Post-Induction  Informed Consent: Informed consent signed with the Patient and all parties understand the risks and agree with anesthesia plan.  All questions answered.   ASA Score: 2    Ready For Surgery From Anesthesia Perspective.   .

## 2024-10-14 NOTE — ANESTHESIA POSTPROCEDURE EVALUATION
Anesthesia Post Evaluation    Patient: Zahira Kovacs    Procedure(s) Performed: Procedure(s) (LRB):  Transesophageal echo (BERNARDO) intra-procedure log documentation (N/A)    Final Anesthesia Type: MAC      Patient location during evaluation: PACU  Patient participation: Yes- Able to Participate  Level of consciousness: awake and alert and oriented  Post-procedure vital signs: reviewed and stable  Pain management: adequate  Airway patency: patent    PONV status at discharge: No PONV  Anesthetic complications: no      Cardiovascular status: blood pressure returned to baseline and stable  Respiratory status: unassisted and spontaneous ventilation  Hydration status: euvolemic  Follow-up not needed.              Vitals Value Taken Time   /74 10/14/24 1245   Temp 36.2 °C (97.1 °F) 10/14/24 1220   Pulse 74 10/14/24 1245   Resp 12 10/14/24 1245   SpO2 100 % 10/14/24 1245         Event Time   Out of Recovery 13:02:00         Pain/Geno Score: Geno Score: 10 (10/14/2024 12:55 PM)

## 2024-10-14 NOTE — TRANSFER OF CARE
"Anesthesia Transfer of Care Note    Patient: Zahira Kovacs    Procedure(s) Performed: Procedure(s) (LRB):  Transesophageal echo (BERNARDO) intra-procedure log documentation (N/A)    Patient location: PACU    Anesthesia Type: MAC    Transport from OR: Transported from OR on room air with adequate spontaneous ventilation    Post pain: adequate analgesia    Post assessment: no apparent anesthetic complications and tolerated procedure well    Post vital signs: stable    Level of consciousness: sedated    Nausea/Vomiting: no nausea/vomiting    Complications: none    Transfer of care protocol was followed    Last vitals: Visit Vitals  /60 (BP Location: Right arm)   Pulse 66   Temp 36.6 °C (97.9 °F) (Skin)   Resp 19   Ht 5' 5" (1.651 m)   Wt 50.3 kg (111 lb)   SpO2 100%   Breastfeeding No   BMI 18.47 kg/m²     "

## 2024-10-15 VITALS
OXYGEN SATURATION: 100 % | TEMPERATURE: 97 F | BODY MASS INDEX: 18.49 KG/M2 | DIASTOLIC BLOOD PRESSURE: 74 MMHG | RESPIRATION RATE: 12 BRPM | SYSTOLIC BLOOD PRESSURE: 124 MMHG | HEART RATE: 74 BPM | HEIGHT: 65 IN | WEIGHT: 111 LBS

## 2024-10-28 ENCOUNTER — OFFICE VISIT (OUTPATIENT)
Dept: NEUROLOGY | Facility: CLINIC | Age: 44
End: 2024-10-28
Payer: COMMERCIAL

## 2024-10-28 DIAGNOSIS — K59.00 CONSTIPATION, UNSPECIFIED CONSTIPATION TYPE: ICD-10-CM

## 2024-10-28 DIAGNOSIS — Q21.12 PFO (PATENT FORAMEN OVALE): ICD-10-CM

## 2024-10-28 DIAGNOSIS — G43.419 INTRACTABLE HEMIPLEGIC MIGRAINE WITHOUT STATUS MIGRAINOSUS: ICD-10-CM

## 2024-10-28 DIAGNOSIS — G43.719 INTRACTABLE CHRONIC MIGRAINE WITHOUT AURA AND WITHOUT STATUS MIGRAINOSUS: Primary | ICD-10-CM

## 2024-10-28 PROCEDURE — 1159F MED LIST DOCD IN RCRD: CPT | Mod: CPTII,95,, | Performed by: NURSE PRACTITIONER

## 2024-10-28 PROCEDURE — 1160F RVW MEDS BY RX/DR IN RCRD: CPT | Mod: CPTII,95,, | Performed by: NURSE PRACTITIONER

## 2024-10-28 PROCEDURE — 99214 OFFICE O/P EST MOD 30 MIN: CPT | Mod: 95,,, | Performed by: NURSE PRACTITIONER

## 2024-10-28 RX ORDER — BUTALBITAL, ACETAMINOPHEN AND CAFFEINE 50; 325; 40 MG/1; MG/1; MG/1
TABLET ORAL
Qty: 10 TABLET | Refills: 0 | Status: SHIPPED | OUTPATIENT
Start: 2024-10-28

## 2024-11-18 ENCOUNTER — OFFICE VISIT (OUTPATIENT)
Dept: OBSTETRICS AND GYNECOLOGY | Facility: CLINIC | Age: 44
End: 2024-11-18
Payer: COMMERCIAL

## 2024-11-18 VITALS
WEIGHT: 111.31 LBS | SYSTOLIC BLOOD PRESSURE: 116 MMHG | HEIGHT: 65 IN | DIASTOLIC BLOOD PRESSURE: 70 MMHG | BODY MASS INDEX: 18.55 KG/M2

## 2024-11-18 DIAGNOSIS — R87.619 ABNORMAL CERVICAL PAPANICOLAOU SMEAR, UNSPECIFIED ABNORMAL PAP FINDING: Primary | ICD-10-CM

## 2024-11-18 PROCEDURE — 99214 OFFICE O/P EST MOD 30 MIN: CPT | Mod: S$GLB,,, | Performed by: OBSTETRICS & GYNECOLOGY

## 2024-11-18 PROCEDURE — 1160F RVW MEDS BY RX/DR IN RCRD: CPT | Mod: CPTII,S$GLB,, | Performed by: OBSTETRICS & GYNECOLOGY

## 2024-11-18 PROCEDURE — 3008F BODY MASS INDEX DOCD: CPT | Mod: CPTII,S$GLB,, | Performed by: OBSTETRICS & GYNECOLOGY

## 2024-11-18 PROCEDURE — 3074F SYST BP LT 130 MM HG: CPT | Mod: CPTII,S$GLB,, | Performed by: OBSTETRICS & GYNECOLOGY

## 2024-11-18 PROCEDURE — 3078F DIAST BP <80 MM HG: CPT | Mod: CPTII,S$GLB,, | Performed by: OBSTETRICS & GYNECOLOGY

## 2024-11-18 PROCEDURE — 99999 PR PBB SHADOW E&M-EST. PATIENT-LVL IV: CPT | Mod: PBBFAC,,, | Performed by: OBSTETRICS & GYNECOLOGY

## 2024-11-18 PROCEDURE — 88175 CYTOPATH C/V AUTO FLUID REDO: CPT | Performed by: PATHOLOGY

## 2024-11-18 PROCEDURE — 1159F MED LIST DOCD IN RCRD: CPT | Mod: CPTII,S$GLB,, | Performed by: OBSTETRICS & GYNECOLOGY

## 2024-11-18 NOTE — PROGRESS NOTES
"Chief Complaint   Patient presents with    repeat pap       History of Present Illness   43 y.o. WF  No obstetric history on file. patient presents today for pap smear.  Last visit was for colposcopy and bx showed LGSIL.  Pt had HPV vaccine.  Light menses today.  Has been having problems with constipation, has GI eval. scheduled    Past medical and surgical history reviewed.   I have reviewed the patient's medical history in detail and updated the computerized patient record.    Review of patient's allergies indicates:   Allergen Reactions    Aspirin Shortness Of Breath    Sulfamethoxazole-trimethoprim Shortness Of Breath    Vistaril [hydroxyzine pamoate] Shortness Of Breath    Gadolinium-containing contrast media Rash     Mild localized rash in chest area; denied (-) itchiness and (-) shortness of breath  Mild localized rash in chest area; denied (-) itchiness and (-) shortness of breath           Review of Systems -   GEN ROS: constipation  Breast ROS: negative for breast lumps  Genito-Urinary ROS: negative      Physical Examination:  /70 (BP Location: Left forearm, Patient Position: Sitting)   Ht 5' 5" (1.651 m)   Wt 50.5 kg (111 lb 5.3 oz)   LMP 10/23/2024 (Exact Date)   BMI 18.53 kg/m²      OBGyn Exam   Abd: non tender, no rebound, no guarding, no organomegaly  V,v: no lesions, menses  Cervix: no lesions,, pap  Uterus: nssp  Adnexa: no masses, non tender  Assessment:    1. Abnormal cervical Papanicolaou smear, unspecified abnormal pap finding  Liquid-Based Pap Smear, Diagnostic          Plan:  Pap  Constipation eval with GI  F/u 6 mos annual with pap if this pap is normal  Patient informed will be contacted with results within 2 weeks. Encouraged to please call back or email if she has not heard from us by then.          "

## 2024-11-22 LAB
FINAL PATHOLOGIC DIAGNOSIS: ABNORMAL
Lab: ABNORMAL

## 2024-12-09 ENCOUNTER — PATIENT MESSAGE (OUTPATIENT)
Dept: OBSTETRICS AND GYNECOLOGY | Facility: CLINIC | Age: 44
End: 2024-12-09
Payer: COMMERCIAL

## 2024-12-10 ENCOUNTER — PATIENT MESSAGE (OUTPATIENT)
Dept: OBSTETRICS AND GYNECOLOGY | Facility: CLINIC | Age: 44
End: 2024-12-10
Payer: COMMERCIAL

## 2024-12-17 ENCOUNTER — OFFICE VISIT (OUTPATIENT)
Dept: OBSTETRICS AND GYNECOLOGY | Facility: CLINIC | Age: 44
End: 2024-12-17
Payer: COMMERCIAL

## 2024-12-17 VITALS
HEIGHT: 65 IN | BODY MASS INDEX: 18.55 KG/M2 | SYSTOLIC BLOOD PRESSURE: 96 MMHG | WEIGHT: 111.31 LBS | DIASTOLIC BLOOD PRESSURE: 62 MMHG

## 2024-12-17 DIAGNOSIS — R87.612 LGSIL ON PAP SMEAR OF CERVIX: Primary | ICD-10-CM

## 2024-12-17 DIAGNOSIS — Z01.812 PRE-PROCEDURE LAB EXAM: ICD-10-CM

## 2024-12-17 DIAGNOSIS — R87.810 ATYPICAL SQUAMOUS CELL CHANGES OF UNDETERMINED SIGNIFICANCE (ASCUS) ON CERVICAL CYTOLOGY WITH POSITIVE HIGH RISK HUMAN PAPILLOMA VIRUS (HPV): ICD-10-CM

## 2024-12-17 DIAGNOSIS — R87.610 ATYPICAL SQUAMOUS CELL CHANGES OF UNDETERMINED SIGNIFICANCE (ASCUS) ON CERVICAL CYTOLOGY WITH POSITIVE HIGH RISK HUMAN PAPILLOMA VIRUS (HPV): ICD-10-CM

## 2024-12-17 LAB
B-HCG UR QL: NEGATIVE
CTP QC/QA: YES

## 2024-12-17 PROCEDURE — 99999 PR PBB SHADOW E&M-EST. PATIENT-LVL III: CPT | Mod: PBBFAC,,, | Performed by: OBSTETRICS & GYNECOLOGY

## 2024-12-17 PROCEDURE — 88305 TISSUE EXAM BY PATHOLOGIST: CPT | Performed by: STUDENT IN AN ORGANIZED HEALTH CARE EDUCATION/TRAINING PROGRAM

## 2024-12-17 PROCEDURE — 57456 ENDOCERV CURETTAGE W/SCOPE: CPT | Mod: S$GLB,,, | Performed by: OBSTETRICS & GYNECOLOGY

## 2024-12-17 PROCEDURE — 81025 URINE PREGNANCY TEST: CPT | Mod: S$GLB,,, | Performed by: OBSTETRICS & GYNECOLOGY

## 2024-12-17 PROCEDURE — 99499 UNLISTED E&M SERVICE: CPT | Mod: S$GLB,,, | Performed by: OBSTETRICS & GYNECOLOGY

## 2024-12-17 NOTE — PROGRESS NOTES
COLPOSCOPY:  12/17/2024    PAP Result:   1. LGSIL on Pap smear of cervix  POCT urine pregnancy    Specimen to Pathology, Ob/Gyn      2. Pre-procedure lab exam  POCT urine pregnancy      3. Atypical squamous cell changes of undetermined significance (ASCUS) on cervical cytology with positive high risk human papilloma virus (HPV)            PRE-COLPOSCOPY PROCEDURE COUNSELING:  Discussed the abnormal pap test findings, HPV, need for colposcopy and possible biopsies to determine a diagnosis and plan of care, treatments available, the minimal risks of bleeding and infection with a colposcopy, alternatives to colposcopy and she agrees to proceed.    Procedure:   Speculum was placed. Cervix completely visualized. The transformation zone clearly visualized. Green filter activated: vascular abnormalities: not seen  Green filter disengaged and acetic acid applied in the usual fashion:  AcetoWhite epithelium not  seen.  Mosaic pattern not  seen.  Biopsy not  performed.  ECC  done.    The speculum was removed.  The patient tolerated the procedure well.    POST COLPOSCOPY COUNSELING:   HPV vaccine recommended according to FDA age guidelines.  Importance of follow-up stressed.    Counseling lasted approximately 15 minutes and all her questions were answered.    Assessment:  1. LGSIL on Pap smear of cervix  POCT urine pregnancy    Specimen to Pathology, Ob/Gyn      2. Pre-procedure lab exam  POCT urine pregnancy      3. Atypical squamous cell changes of undetermined significance (ASCUS) on cervical cytology with positive high risk human papilloma virus (HPV)          Had 2 od the HPV vaccine  Pap improved from LGSIL to ASCUS, HPV +  Plan:  ECC  If neg ECC repap 6 mos and complete HPV vaccine    Patient informed will be contacted within 2 weeks of results, either normal or abnormal. Please call if she has not heard from us by then.

## 2024-12-19 LAB
FINAL PATHOLOGIC DIAGNOSIS: NORMAL
GROSS: NORMAL
Lab: NORMAL

## 2025-01-14 ENCOUNTER — CLINICAL SUPPORT (OUTPATIENT)
Dept: OBSTETRICS AND GYNECOLOGY | Facility: CLINIC | Age: 45
End: 2025-01-14
Payer: COMMERCIAL

## 2025-01-14 DIAGNOSIS — Z23 ENCOUNTER FOR ADMINISTRATION OF VACCINE: Primary | ICD-10-CM

## 2025-01-14 PROCEDURE — 90651 9VHPV VACCINE 2/3 DOSE IM: CPT | Mod: S$GLB,,, | Performed by: OBSTETRICS & GYNECOLOGY

## 2025-01-14 PROCEDURE — 90471 IMMUNIZATION ADMIN: CPT | Mod: S$GLB,,, | Performed by: OBSTETRICS & GYNECOLOGY

## 2025-01-14 PROCEDURE — 99999 PR PBB SHADOW E&M-EST. PATIENT-LVL I: CPT | Mod: PBBFAC,,,

## 2025-01-14 NOTE — PROGRESS NOTES
Pt name,  and allergies were reviewed and verified. Pt was given 3rd HPV injection in the right deltoid and no adverse reaction seen. Pt advised to wait in out in the lobby for 15 minutes just incase an allergic reaction occurs.

## 2025-01-31 ENCOUNTER — OFFICE VISIT (OUTPATIENT)
Dept: CARDIOLOGY | Facility: CLINIC | Age: 45
End: 2025-01-31
Payer: COMMERCIAL

## 2025-01-31 VITALS
HEIGHT: 65 IN | WEIGHT: 117.5 LBS | BODY MASS INDEX: 19.58 KG/M2 | SYSTOLIC BLOOD PRESSURE: 104 MMHG | OXYGEN SATURATION: 100 % | HEART RATE: 78 BPM | DIASTOLIC BLOOD PRESSURE: 70 MMHG | RESPIRATION RATE: 18 BRPM

## 2025-01-31 DIAGNOSIS — Z13.220 LIPID SCREENING: ICD-10-CM

## 2025-01-31 DIAGNOSIS — I35.8 MILD AORTIC VALVE SCLEROSIS: Primary | ICD-10-CM

## 2025-01-31 PROCEDURE — 99999 PR PBB SHADOW E&M-EST. PATIENT-LVL IV: CPT | Mod: PBBFAC,,, | Performed by: INTERNAL MEDICINE

## 2025-01-31 PROCEDURE — 99213 OFFICE O/P EST LOW 20 MIN: CPT | Mod: S$GLB,,, | Performed by: INTERNAL MEDICINE

## 2025-01-31 PROCEDURE — 3074F SYST BP LT 130 MM HG: CPT | Mod: CPTII,S$GLB,, | Performed by: INTERNAL MEDICINE

## 2025-01-31 PROCEDURE — 1159F MED LIST DOCD IN RCRD: CPT | Mod: CPTII,S$GLB,, | Performed by: INTERNAL MEDICINE

## 2025-01-31 PROCEDURE — 3008F BODY MASS INDEX DOCD: CPT | Mod: CPTII,S$GLB,, | Performed by: INTERNAL MEDICINE

## 2025-01-31 PROCEDURE — 3078F DIAST BP <80 MM HG: CPT | Mod: CPTII,S$GLB,, | Performed by: INTERNAL MEDICINE

## 2025-01-31 NOTE — PROGRESS NOTES
Subjective:    Patient ID:  Zahira Kovacs is a 44 y.o. female who presents for Follow-up (BERNARDO)        HPI  STATUS POST BERNARDO NO INTRACARDIAC SHUNT MILD AORTIC VALVE SCLEROSIS, DOING WELL, SEE ROS    Past Medical History:   Diagnosis Date    Allergy     Breast disorder     Depression     GERD (gastroesophageal reflux disease)      Past Surgical History:   Procedure Laterality Date     SECTION      CRANIOTOMY  2021    micro decompression of facial nerve    ECHOCARDIOGRAM,TRANSESOPHAGEAL N/A 10/14/2024    Procedure: Transesophageal echo (BERNARDO) intra-procedure log documentation;  Surgeon: Alejandra Bond MD;  Location: [facility] ENDO;  Service: Cardiology;  Laterality: N/A;    LAPAROSCOPIC CHOLECYSTECTOMY N/A 2022    Procedure: CHOLECYSTECTOMY, LAPAROSCOPIC;  Surgeon: Raman Camargo MD;  Location: [facility];  Service: General;  Laterality: N/A;     Family History   Problem Relation Name Age of Onset    Cancer Mother          leukemia    Migraines Mother      Hypertension Father       Social History     Socioeconomic History    Marital status:    Tobacco Use    Smoking status: Never    Smokeless tobacco: Never   Substance and Sexual Activity    Alcohol use: Not Currently     Comment: stopped drinking 5 months ago    Drug use: No     Social Drivers of Health     Financial Resource Strain: Low Risk  (2024)    Overall Financial Resource Strain (CARDIA)     Difficulty of Paying Living Expenses: Not hard at all   Food Insecurity: No Food Insecurity (2024)    Hunger Vital Sign     Worried About Running Out of Food in the Last Year: Never true     Ran Out of Food in the Last Year: Never true   Transportation Needs: No Transportation Needs (6/15/2020)    Received from [facility], [facility]    PRAPARE - Transportation     Lack of Transportation  (Medical): No     Lack of Transportation (Non-Medical): No   Physical Activity: Inactive (7/24/2024)    Exercise Vital Sign     Days of Exercise per Week: 0 days     Minutes of Exercise per Session: 0 min   Stress: Stress Concern Present (7/24/2024)    [facility] - Occupational Stress Questionnaire     Feeling of Stress : Very much   Housing Stability: Unknown (7/24/2024)    Housing Stability Vital Sign     Unable to Pay for Housing in the Last Year: No       Review of patient's allergies indicates:   Allergen Reactions    Aspirin Shortness Of Breath    Sulfamethoxazole-trimethoprim Shortness Of Breath    Vistaril [hydroxyzine pamoate] Shortness Of Breath    Gadolinium-containing contrast media Rash     Mild localized rash in chest area; denied (-) itchiness and (-) shortness of breath  Mild localized rash in chest area; denied (-) itchiness and (-) shortness of breath         Current Outpatient Medications:     AUVELITY  mg TbIE, , Disp: , Rfl:     butalbital-acetaminophen-caffeine -40 mg (FIORICET, ESGIC) -40 mg per tablet, Take 1 tablet by mouth as needed for migraine. No more than 10 tab per month., Disp: 10 tablet, Rfl: 0    galcanezumab-gnlm (EMGALITY PEN) 120 mg/mL PnIj, Inject 1 mL (120 mg total) into the skin every 28 days., Disp: 1 mL, Rfl: 11    lamoTRIgine (LAMICTAL) 100 MG tablet, , Disp: , Rfl:     lamoTRIgine (LAMICTAL) 25 MG tablet, Take by mouth., Disp: , Rfl:     levomefolate calcium (ELFOLATE) 15 mg Tab, , Disp: , Rfl:     naltrexone (DEPADE) 50 mg tablet, Take 50 mg by mouth., Disp: , Rfl:     onabotulinumtoxinA (BOTOX INJ), Inject as directed. Every 3 months facial area, Disp: , Rfl:     ondansetron (ZOFRAN-ODT) 4 MG TbDL, Take 1 tablet (4 mg total) by mouth every 6 (six) hours as needed (nausea)., Disp: 30 tablet, Rfl: 11    propranoloL (INDERAL) 10 MG tablet, , Disp: , Rfl:     rimegepant (NURTEC) 75 mg odt, Take 1 tablet (75 mg total) by mouth  "daily as needed for Migraine. Place ODT tablet on the tongue; alternatively the ODT tablet may be placed under the tongue, Disp: 9 tablet, Rfl: 11    WELLBUTRIN  mg TBSR 12 hr tablet, , Disp: , Rfl:     Review of Systems   Constitutional: Negative for chills, diaphoresis, malaise/fatigue and night sweats.   HENT:  Negative for congestion, hearing loss and nosebleeds.    Eyes:  Negative for blurred vision and visual disturbance.   Cardiovascular:  Negative for chest pain, claudication, cyanosis, dyspnea on exertion, irregular heartbeat, leg swelling, near-syncope, orthopnea, palpitations, paroxysmal nocturnal dyspnea and syncope.   Respiratory:  Negative for cough, hemoptysis, shortness of breath, sleep disturbances due to breathing, sputum production and wheezing.    Endocrine: Negative for cold intolerance, heat intolerance and polyuria.   Hematologic/Lymphatic: Negative for adenopathy. Does not bruise/bleed easily.   Skin:  Negative for color change, itching and nail changes.   Musculoskeletal:  Negative for back pain, falls and joint pain.   Gastrointestinal:  Negative for abdominal pain, change in bowel habit, dysphagia, jaundice, melena and nausea.   Genitourinary:  Negative for dysuria and flank pain.   Neurological:  Negative for brief paralysis, headaches (CHRONIC), light-headedness, loss of balance, numbness, paresthesias, seizures, sensory change, tremors and weakness.   Psychiatric/Behavioral:  Negative for altered mental status, depression and memory loss. The patient is not nervous/anxious.    Allergic/Immunologic: Negative for persistent infections.        Objective:      Vitals:    01/31/25 1154   BP: 104/70   Pulse: 78   Resp: 18   SpO2: 100%   Weight: 53.3 kg (117 lb 8.1 oz)   Height: 5' 5" (1.651 m)   PainSc: 0-No pain     Body mass index is 19.55 kg/m².    Physical Exam  HENT:      Head: Normocephalic and atraumatic.   Neck:      Vascular: No carotid bruit.   Cardiovascular:      Rate and " "Rhythm: Normal rate and regular rhythm.      Pulses: Normal pulses.      Heart sounds: Normal heart sounds.   Pulmonary:      Effort: Pulmonary effort is normal.      Breath sounds: Normal breath sounds.   Musculoskeletal:      Cervical back: Neck supple.   Neurological:      General: No focal deficit present.      Mental Status: She is alert and oriented to person, place, and time.                 ..    Chemistry        Component Value Date/Time     06/13/2024 1640    K 4.5 06/13/2024 1640     06/13/2024 1640    CO2 26 06/13/2024 1640    BUN 12 06/13/2024 1640    CREATININE 0.75 06/13/2024 1640    GLU 87 06/13/2024 1640        Component Value Date/Time    CALCIUM 9.8 06/13/2024 1640    ALKPHOS 59 06/13/2024 1640    AST 27 06/13/2024 1640    ALT 22 06/13/2024 1640    BILITOT 0.4 06/13/2024 1640    EGFRNONAA 87 01/25/2016 0927            ..  Lab Results   Component Value Date    CHOL 160 10/02/2023    CHOL 143 01/25/2016     Lab Results   Component Value Date    HDL 74 10/02/2023    HDL 76 01/25/2016     Lab Results   Component Value Date    LDLCALC 70 10/02/2023    LDLCALC 57 01/25/2016     Lab Results   Component Value Date    TRIG 84 10/02/2023    TRIG 50 01/25/2016     No results found for: "CHOLHDL"  ..  Lab Results   Component Value Date    WBC 6.54 06/13/2024    HGB 13.4 06/13/2024    HCT 39.5 06/13/2024    MCV 94 06/13/2024     06/13/2024       Test(s) Reviewed  I have reviewed the following in detail:  [] Stress test   [] Angiography   [x] Echocardiogram   [] Labs   [] Other:       Assessment:         ICD-10-CM ICD-9-CM   1. Mild aortic valve sclerosis  I35.8 424.1   2. Lipid screening  Z13.220 V77.91     Problem List Items Addressed This Visit          Cardiac/Vascular    Lipid screening    Relevant Orders    Lipid Panel    Mild aortic valve sclerosis - Primary        Plan:         ALL CV CLINICALLY STABLE, NO ANGINA, NO HF, NO TIA, NO CLINICAL ARRHYTHMIA,CONTINUE CURRENT MEDS, EDUCATION, " DIET, EXERCISE , RETURN TO CLINIC IN 1 YEAR WITH A LIPID PANEL        Mild aortic valve sclerosis    Lipid screening  -     Lipid Panel; Future; Expected date: 01/31/2025    RTC Low level/low impact aerobic exercise 5x's/wk. Heart healthy diet and risk factor modification.    See labs and med orders.    Aerobic exercise 5x's/wk. Heart healthy diet and risk factor modification.    See labs and med orders.

## 2025-04-04 ENCOUNTER — OFFICE VISIT (OUTPATIENT)
Dept: NEUROLOGY | Facility: CLINIC | Age: 45
End: 2025-04-04
Payer: COMMERCIAL

## 2025-04-04 VITALS
RESPIRATION RATE: 17 BRPM | WEIGHT: 118.25 LBS | HEART RATE: 81 BPM | BODY MASS INDEX: 19.68 KG/M2 | SYSTOLIC BLOOD PRESSURE: 120 MMHG | DIASTOLIC BLOOD PRESSURE: 78 MMHG

## 2025-04-04 DIAGNOSIS — G43.719 INTRACTABLE CHRONIC MIGRAINE WITHOUT AURA AND WITHOUT STATUS MIGRAINOSUS: Primary | ICD-10-CM

## 2025-04-04 PROCEDURE — 99999 PR PBB SHADOW E&M-EST. PATIENT-LVL IV: CPT | Mod: PBBFAC,,, | Performed by: NURSE PRACTITIONER

## 2025-04-04 NOTE — PROGRESS NOTES
Date of service: 4/4/2025  Referring provider: No ref. provider found    Subjective:      Chief complaint: Headache       Patient ID: Zahira Kovacs is a 44 y.o. female with depression, GERD, facial spasm with s/o microvascular decompression who presents for follow up of headache     History of Present Illness    INTERVAL HISTORY 4/4/285    Last visit was six months ago and at that time she was having about 9-10 headache days per month.    Today she reports she is better. Current pain 2 with range 2-7. She has about one headache day per week. She takes ibuprofen once per week. Otherwise information below is reviewed and verified with no changes made     INTERVAL HISTORY 10/28/24  The patient location is: home   The chief complaint leading to consultation is: follow up  Visit type: audiovisual  22 minutes of total time spent on the encounter, which includes face to face time and non-face to face time preparing to see the patient (eg, review of tests), Obtaining and/or reviewing separately obtained history, Documenting clinical information in the electronic or other health record, Independently interpreting results (not separately reported) and communicating results to the patient/family/caregiver, or Care coordination (not separately reported).   Each patient to whom he or she provides medical services by telemedicine is:  (1) informed of the relationship between the physician and patient and the respective role of any other health care provider with respect to management of the patient; and (2) notified that he or she may decline to receive medical services by telemedicine and may withdraw from such care at any time.    Notes:     Last visit was her initial evaluation three months ago. At that time I ordered an ECHO and we started Emgality. ECHO showed shunting and she was referred to cardiology.     Today she reports she is about the same to better. She has had severe and worsening constipation thought to be  due to psych medications. She was started on Linzess. She has about 9-10 headache days per month. Current pain 0 with range 0-6. She takes Nurtec which is more effective than Ubrelvy but does not fully abort migraine. Otherwise information below is reviewed and verified with no changes made    ORIGINAL HEADACHE HISTORY - 7/26/24  Age at onset and course over time: in her 30's  Last month she had an episode of transient neurologic symptoms. She had an episode of near syncope and then weakness. She had numbness and tingling in various extremities, bilaterally. There was only mild and intermittent headaches with these symptoms. She has had recurrence three times this past week. She has new numbness in her abdomen during these episodes.   She has memory loss concerns.   Family history of headaches - mom, brother, one child    Last eye exam - 1-2 months ago  Location: either side of head  Quality:  [] pressure [] tight [] throbbing [x] sharp [] stabbing   Severity: current 4 with range 2-7  Duration: hours, days  Frequency: daily for years   Headaches awaken at night?:  no  Worst time of day: varies  Associated with: [x] photophobia [x]  phonophobia [] osmophobia [] blurred vision  [] double vision [] loss of appetite [] nausea [] vomiting [] dizziness [] vertigo  [] tinnitus [x] irritability [] sinus pressure [x] problems with concentration   [] neck tightness   Alleviated by:  [x] sleep [] darkness [x] massage [] heat [x] ice [] medication  Exacerbated by:  [] fatigue [x] light [x] noise [] smells [] coughing [] sneezing  [x] bending over [] ovulation [] menses [] alcohol [] change in weather []  stress  Ipsilateral autonomic: [] nasal congestion [] lacrimation [] ptosis [] injection [] edema [] foreign body sensation [] ear fullness   ICP:  [] transient visual obscurations  [x] tinnitus high pitched, steady, left ear, unrelated to headaches  [] positional headache  [x] non-positional   Sleep habits: unrefreshing  sleep  Caffeine intake: 1  Gyn status: having periods, no estrogen    HIT 6: 62    Current acute treatment:  Ibuprofen - reduced to 1-2 times per month   Nurtec     Current prevention:  Wellbutrin  Propranolol  Botox - for facial spasm and paralysis (by Dr. Manzo)  Lamotrigine  Emgality - first July 2024    Previously tried/failed acute treatment:  Vistaril - allergy  Sumatriptan  Ubrelvy  Fioricet - not effective     Previously tried/failed preventative treatment:  Sertraline   Trintellix    Review of patient's allergies indicates:   Allergen Reactions    Aspirin Shortness Of Breath    Sulfamethoxazole-trimethoprim Shortness Of Breath    Vistaril [hydroxyzine pamoate] Shortness Of Breath    Gadolinium-containing contrast media Rash     Mild localized rash in chest area; denied (-) itchiness and (-) shortness of breath  Mild localized rash in chest area; denied (-) itchiness and (-) shortness of breath       Current Outpatient Medications   Medication Sig Dispense Refill    AUVELITY  mg TbIE       butalbital-acetaminophen-caffeine -40 mg (FIORICET, ESGIC) -40 mg per tablet Take 1 tablet by mouth as needed for migraine. No more than 10 tab per month. 10 tablet 0    dextromethorphan-bupropion (AUVELITY)  mg TbIE Take 1 tablet by mouth 2 (two) times a day. 60 tablet 6    galcanezumab-gnlm (EMGALITY PEN) 120 mg/mL PnIj Inject 1 mL (120 mg total) into the skin every 28 days. 1 mL 11    lamoTRIgine (LAMICTAL) 100 MG tablet       lamoTRIgine (LAMICTAL) 25 MG tablet Take by mouth.      levomefolate calcium (ELFOLATE) 15 mg Tab       naltrexone (DEPADE) 50 mg tablet Take 50 mg by mouth.      onabotulinumtoxinA (BOTOX INJ) Inject as directed. Every 3 months facial area      ondansetron (ZOFRAN-ODT) 4 MG TbDL Take 1 tablet (4 mg total) by mouth every 6 (six) hours as needed (nausea). 30 tablet 11    propranoloL (INDERAL) 10 MG tablet       rimegepant (NURTEC) 75 mg odt Take 1 tablet (75 mg total) by  mouth daily as needed for Migraine. Place ODT tablet on the tongue; alternatively the ODT tablet may be placed under the tongue 9 tablet 11     No current facility-administered medications for this visit.       Past Medical History  Past Medical History:   Diagnosis Date    Allergy     Breast disorder     Depression     GERD (gastroesophageal reflux disease)        Past Surgical History  Past Surgical History:   Procedure Laterality Date     SECTION      CRANIOTOMY  2021    micro decompression of facial nerve    ECHOCARDIOGRAM,TRANSESOPHAGEAL N/A 10/14/2024    Procedure: Transesophageal echo (BERNARDO) intra-procedure log documentation;  Surgeon: Alejandra Bond MD;  Location: [facility] ENDO;  Service: Cardiology;  Laterality: N/A;    LAPAROSCOPIC CHOLECYSTECTOMY N/A 2022    Procedure: CHOLECYSTECTOMY, LAPAROSCOPIC;  Surgeon: Raman Camargo MD;  Location: [facility];  Service: General;  Laterality: N/A;       Family History  Family History   Problem Relation Name Age of Onset    Cancer Mother          leukemia    Migraines Mother      Hypertension Father         Social History  Social History     Socioeconomic History    Marital status:    Tobacco Use    Smoking status: Never    Smokeless tobacco: Never   Substance and Sexual Activity    Alcohol use: Not Currently     Comment: stopped drinking 5 months ago    Drug use: No     Social Drivers of Health     Financial Resource Strain: Low Risk  (2025)    Overall Financial Resource Strain (CARDIA)     Difficulty of Paying Living Expenses: Not hard at all   Food Insecurity: No Food Insecurity (2025)    Hunger Vital Sign     Worried About Running Out of Food in the Last Year: Never true     Ran Out of Food in the Last Year: Never true   Transportation Needs: No Transportation Needs (2025)    PRAPARE - Transportation     Lack of Transportation (Medical): No     Lack of Transportation (Non-Medical): No   Physical Activity: Inactive (2025)     Exercise Vital Sign     Days of Exercise per Week: 0 days     Minutes of Exercise per Session: 0 min   Stress: Stress Concern Present (4/4/2025)    [employer] [place] [employer] - Occupational Stress Questionnaire     Feeling of Stress : To some extent   Housing Stability: Low Risk  (4/4/2025)    Housing Stability Vital Sign     Unable to Pay for Housing in the Last Year: No     Number of Times Moved in the Last Year: 0     Homeless in the Last Year: No          Objective:        Vitals:    04/04/25 0859   BP: 120/78   Pulse: 81   Resp: 17     Body mass index is 19.68 kg/m².    4/4/25  Constitutional:   She appears well-developed and well-nourished. She is well groomed     Neurological Exam:  General: well-developed, well-nourished, no distress  Mental status: Awake and alert  Speech language: No dysarthria or aphasia on conversation  Cranial nerves: Face symmetric  Motor: Moves all extremities well  Coordination: No ataxia. No tremor.      7/26/24  Constitutional: appears in no acute distress, well-developed, well-nourished     Eyes: normal conjunctiva, PERRLA    Ears, nose, mouth, throat: external appearance of ears and nose normal, hearing intact     Cardiovascular: regular rate and rhythm, no murmurs appreciated    Respiratory: unlabored respirations, breath sounds normal bilaterally    Gastrointestinal: no visible abdominal masses, no guarding, no visible hernia    Musculoskeletal: normal tone in all four extremities. No abnormal movements. No pronator drift. No orbit. Symmetric finger tapping. Normal station. Normal regular gait. Unsteady but able to complete tandem gait.      Spine:   CERVICAL SPINE:  ROM: normal   MUSCLE SPASM: mild bilateral   FACET LOADING: mild bilateral    SPURLING: no  MURIEL / TERESA tender: no     Psychiatric: normal judgment and insight. Oriented to person, place, and time.     Neurologic:   Cortical functions: recent and remote memory intact, normal attention span and  concentration, speech fluent, adequate fund of knowledge   Cranial nerves: visual fields full, PERRLA, EOMI, asymmetric facial strength (left brow weakness), hearing intact, palate elevates symmetrically, shoulder shrug 5/5, tongue protrudes midline   Reflexes: 2+ in the upper and lower extremities, no King  Coordination: normal finger to nose, heel to shin    Data Review:     I have personally reviewed the referring provider's notes, labs, & imaging made available to me today.      RADIOLOGY STUDIES:  I have personally reviewed the pertinent images performed.       Results for orders placed or performed during the hospital encounter of 06/13/24   MRI Brain W WO Contrast    Narrative    EXAMINATION:  MRI BRAIN W WO CONTRAST    CLINICAL HISTORY:  Memory loss;Transient ischemic attack (TIA);    TECHNIQUE:  Multiplanar MR imaging of the brain was performed both before and after IV administration of 10 cc gadolinium    COMPARISON:  None.    FINDINGS:  No acute infarct, mass effect or hemorrhage.  Two punctate FLAIR hyperintense subcortical frontal lobe foci are nonspecific but may be related to early small vessel ischemic disease.  Meningeal and parenchymal enhancement pattern is normal.  Ventricles and sulci are proportionate.    No abnormal extra-axial fluid collections.    Flow voids are maintained in the intracranial arteries and dural venous sinuses.    Calvarium has a normal signal.  Mucosal thickening is in the paranasal sinuses.      Impression    No acute findings in the brain or abnormal enhancement      Electronically signed by: Laurent Russell MD  Date:    06/13/2024  Time:    20:56   CT Head Without Contrast    Narrative    EXAMINATION:  CT HEAD WITHOUT CONTRAST    CLINICAL HISTORY:  Neurological deficit.    TECHNIQUE:  Axial CT images were obtained of the brain without intravenous contrast.  Coronal and sagittal reformations were obtained.  Automated exposure control utilized to reduce radiation dose.   Total exam DLP is 1139 mGy cm.    COMPARISON:  None.    FINDINGS:  Gray-white matter differentiation is within normal limits.   No acute intracranial hemorrhage, extra-axial fluid collection, hydrocephalus, mass effect, or midline shift is noted.  No large vessel territory acute ischemia is identified.  Visualized paranasal sinuses demonstrate scattered mucoperiosteal thickening.  Visualized mastoid air cells are clear.  Left occipital craniectomy changes noted and mastoidectomy changes noted.  No acute displaced calvarial fracture is identified.      Impression    1. No acute intracranial abnormalities identified.      Electronically signed by: Christopher Engel MD  Date:    06/13/2024  Time:    19:28       Lab Results   Component Value Date     06/13/2024    K 4.5 06/13/2024    MG 2.2 06/13/2024     06/13/2024    CO2 26 06/13/2024    BUN 12 06/13/2024    CREATININE 0.75 06/13/2024    GLU 87 06/13/2024    HGBA1C 4.9 10/02/2023    AST 27 06/13/2024    ALT 22 06/13/2024    ALBUMIN 4.5 06/13/2024    PROT 7.4 06/13/2024    BILITOT 0.4 06/13/2024    CHOL 160 10/02/2023    CHOL 143 01/25/2016    HDL 74 10/02/2023    HDL 76 01/25/2016    LDLCALC 70 10/02/2023    LDLCALC 57 01/25/2016    TRIG 84 10/02/2023    TRIG 50 01/25/2016       Lab Results   Component Value Date    WBC 6.54 06/13/2024    HGB 13.4 06/13/2024    HCT 39.5 06/13/2024    MCV 94 06/13/2024     06/13/2024       Lab Results   Component Value Date    TSH 2.380 06/13/2024           Assessment & Plan:       Problem List Items Addressed This Visit          Neuro    Intractable chronic migraine without aura and without status migrainosus - Primary    Overview   Migraine headaches since at least her 30's with strong family history. Headaches are typically unilateral, moderate to severe in intensity, worsen with activity, and associated with sensitivity to light and sound. Recent brain MRI unremarkable.   We discussed optimizing prevention as she has  a daily headache with at least 10 escalations per month. Discussed added CGRP for prevention as she gets Botox for facial paralysis already. Avoid Aimovig and Qulipta due to underlying constipation. Galcanezumab (Emgality) treatment was approved for the prevention of acute and chronic migraine on 9/27/2018. The patient will be an ideal candidate for Emgality. We are planning for 3 treatments 28 days apart. If we see no improvement after 3 treatments, we will discontinue the injections.   For acute attacks, avoid triptans due to possible hemiplegic migraines. Add gepant.            Current Assessment & Plan   She has been on Emgality over 6 months and has gone from 9-10 migraine escalations per month to about 4. She still has a mild background daily headache. 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 Will see if that provider can add units to temporalis and occipitalis for added migraine prevention.                     Please call our clinic at [phone] or send a message on the [facility] portal if there are any changes to the plan described below, for example,if you are not contacted for the requested tests, referral(s) within one week, if you are unable to receive the medications prescribed, or if you feel you need to change the treatment course for any reason.     TESTING:  -- none    REFERRALS:  -- none     PREVENTION (use daily regardless of headache):  -- continue Emgality injection once every 28 days  --discuss with Dr. Manzo if she can add 20 units bilateral temporalis and 15 units bilateral occipitals for Botox  -- continue magnesium in ONE of the following preparations -               1. Magnesium oxide 800mg daily (the most common over the counter kind, may causes loose stools)              2. Magnesium citrate 400-500mg daily (harder to find, but more neutral on the bowels)              3. Magnesium glycinate 400mg daily (hardest to find, look online, but most bowel-neutral, best  absorbed)     AS-NEEDED TREATMENT (use total no more than 10 days per month unless otherwise stated):  - continue Nurtec with next migraine. This dissolves under the tongue and is only taken once in 24 hour time frame. If migraine still present at one hour, you can RESCUE with Fioricet  -- continue Zofran for nausea     Follow up in about 6 months (around 10/4/2025).    Tg Phillips, NP

## 2025-04-04 NOTE — PATIENT INSTRUCTIONS
Please call our clinic at [phone] or send a message on the [facility] portal if there are any changes to the plan described below, for example,if you are not contacted for the requested tests, referral(s) within one week, if you are unable to receive the medications prescribed, or if you feel you need to change the treatment course for any reason.     TESTING:  -- none    REFERRALS:  -- none     PREVENTION (use daily regardless of headache):  -- continue Emgality injection once every 28 days  --discuss with Dr. Manzo if she can add 20 units bilateral temporalis and 15 units bilateral occipitals for Botox  -- continue magnesium in ONE of the following preparations -               1. Magnesium oxide 800mg daily (the most common over the counter kind, may causes loose stools)              2. Magnesium citrate 400-500mg daily (harder to find, but more neutral on the bowels)              3. Magnesium glycinate 400mg daily (hardest to find, look online, but most bowel-neutral, best absorbed)     AS-NEEDED TREATMENT (use total no more than 10 days per month unless otherwise stated):  - continue Nurtec with next migraine. This dissolves under the tongue and is only taken once in 24 hour time frame. If migraine still present at one hour, you can RESCUE with Fioricet  -- continue Zofran for nausea

## 2025-04-04 NOTE — ASSESSMENT & PLAN NOTE
She has been on Emgality over 6 months and has gone from 9-10 migraine escalations per month to about 4. She still has a mild background daily headache. 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 Will see if that provider can add units to temporalis and occipitalis for added migraine prevention.

## 2025-04-23 ENCOUNTER — PATIENT MESSAGE (OUTPATIENT)
Dept: NEUROLOGY | Facility: CLINIC | Age: 45
End: 2025-04-23
Payer: COMMERCIAL

## 2025-04-23 DIAGNOSIS — G43.719 INTRACTABLE CHRONIC MIGRAINE WITHOUT AURA AND WITHOUT STATUS MIGRAINOSUS: Primary | ICD-10-CM

## 2025-04-23 DIAGNOSIS — G43.419 INTRACTABLE HEMIPLEGIC MIGRAINE WITHOUT STATUS MIGRAINOSUS: ICD-10-CM

## 2025-04-23 NOTE — TELEPHONE ENCOUNTER
The patient has chronic migraines ( G43.719) and suffers from headaches more than 3 months, more than 15 days of headache days per month lasting more than 4 hours with at least 8 attacks that meet criteria for migraine. She has tried multiple medications including but not limited to Wellbutrin, propranolol, lamotrigine, Emgality, sertraline, Trintellix  The patient has been unresponsive and refractory.The patient meets criteria for chronic headaches according to the ICHD-II, the patient has more than 15 headaches a month which last for more than 4 hours a day. The patient is an ideal candidate for Botox. After treatment, I expect 50%  improvement in the patient's symptoms. A reduction of at least 7 days per month and the number of cumulative hours suffering with headaches as well as at least 100 total hours affected with migraine per month.  DESCRIPTION OF PROCEDURE: After obtaining informed consent and under aseptic technique, a total of 70 units of botulinum toxin type A to be injected in the following muscles:      -- Temporalis 20 units bilaterally  -- Occipitalis 15 units bilaterally    Unavoidable waste 30 units    Please schedule patient for Botox for the week of July 17. 100 units

## 2025-05-28 ENCOUNTER — OFFICE VISIT (OUTPATIENT)
Dept: OBSTETRICS AND GYNECOLOGY | Facility: CLINIC | Age: 45
End: 2025-05-28
Payer: COMMERCIAL

## 2025-05-28 VITALS
SYSTOLIC BLOOD PRESSURE: 104 MMHG | DIASTOLIC BLOOD PRESSURE: 68 MMHG | HEIGHT: 65 IN | BODY MASS INDEX: 20.01 KG/M2 | WEIGHT: 120.13 LBS

## 2025-05-28 DIAGNOSIS — Z01.419 ENCOUNTER FOR ANNUAL ROUTINE GYNECOLOGICAL EXAMINATION: Primary | ICD-10-CM

## 2025-05-28 DIAGNOSIS — Z12.39 SCREENING BREAST EXAMINATION: ICD-10-CM

## 2025-05-28 DIAGNOSIS — Z87.42 HX OF ABNORMAL CERVICAL PAP SMEAR: ICD-10-CM

## 2025-05-28 PROCEDURE — 99396 PREV VISIT EST AGE 40-64: CPT | Mod: S$GLB,,, | Performed by: OBSTETRICS & GYNECOLOGY

## 2025-05-28 PROCEDURE — 99999 PR PBB SHADOW E&M-EST. PATIENT-LVL III: CPT | Mod: PBBFAC,,, | Performed by: OBSTETRICS & GYNECOLOGY

## 2025-05-28 PROCEDURE — 1160F RVW MEDS BY RX/DR IN RCRD: CPT | Mod: CPTII,S$GLB,, | Performed by: OBSTETRICS & GYNECOLOGY

## 2025-05-28 PROCEDURE — 3078F DIAST BP <80 MM HG: CPT | Mod: CPTII,S$GLB,, | Performed by: OBSTETRICS & GYNECOLOGY

## 2025-05-28 PROCEDURE — 3074F SYST BP LT 130 MM HG: CPT | Mod: CPTII,S$GLB,, | Performed by: OBSTETRICS & GYNECOLOGY

## 2025-05-28 PROCEDURE — 1159F MED LIST DOCD IN RCRD: CPT | Mod: CPTII,S$GLB,, | Performed by: OBSTETRICS & GYNECOLOGY

## 2025-05-28 PROCEDURE — 3008F BODY MASS INDEX DOCD: CPT | Mod: CPTII,S$GLB,, | Performed by: OBSTETRICS & GYNECOLOGY

## 2025-05-28 RX ORDER — BUPROPION HYDROCHLORIDE 150 MG/1
150 TABLET ORAL EVERY MORNING
COMMUNITY

## 2025-05-28 RX ORDER — FLUTICASONE PROPIONATE 0.5 MG/G
CREAM TOPICAL
COMMUNITY
Start: 2024-06-28

## 2025-05-28 NOTE — PROGRESS NOTES
Chief Complaint   Patient presents with    Well Woman       History and Physical:  Patient's last menstrual period was 2025.       Zahira Kovacs is a 44 y.o. No obstetric history on file. WF who presents today for her routine annual GYN exam. The patient has no Gynecology complaints today.       Allergies:   Review of patient's allergies indicates:   Allergen Reactions    Aspirin Shortness Of Breath    Sulfamethoxazole-trimethoprim Shortness Of Breath    Vistaril [hydroxyzine pamoate] Shortness Of Breath    Gadolinium-containing contrast media Rash     Mild localized rash in chest area; denied (-) itchiness and (-) shortness of breath  Mild localized rash in chest area; denied (-) itchiness and (-) shortness of breath         Past Medical History:   Diagnosis Date    Allergy     Breast disorder     Depression     GERD (gastroesophageal reflux disease)        Past Surgical History:   Procedure Laterality Date     SECTION      CRANIOTOMY  2021    micro decompression of facial nerve    ECHOCARDIOGRAM,TRANSESOPHAGEAL N/A 10/14/2024    Procedure: Transesophageal echo (BERNARDO) intra-procedure log documentation;  Surgeon: Alejandra Bond MD;  Location: [facility];  Service: Cardiology;  Laterality: N/A;    LAPAROSCOPIC CHOLECYSTECTOMY N/A 2022    Procedure: CHOLECYSTECTOMY, LAPAROSCOPIC;  Surgeon: Raman Camargo MD;  Location: [facility];  Service: General;  Laterality: N/A;       MEDS: Medications Ordered Prior to Encounter[1]    OB History    No obstetric history on file.         Social History[2]    Family History   Problem Relation Name Age of Onset    Cancer Mother          leukemia    Migraines Mother      Hypertension Father           Past medical and surgical history reviewed.   I have reviewed the patient's medical history in detail and updated the computerized patient record.        Review of System:   General: no chills, fever, night sweats, weight gain or weight loss  Psychological: no  "depression or suicidal ideation  Breasts: no new or changing breast lumps, nipple discharge or masses.  Respiratory: no cough, shortness of breath, or wheezing  Cardiovascular: no chest pain or dyspnea on exertion  Gastrointestinal: no abdominal pain, change in bowel habits, or black or bloody stools  Genito-Urinary: no incontinence, urinary frequency/urgency or vulvar/vaginal symptoms, pelvic pain or abnormal vaginal bleeding.  Musculoskeletal: no gait disturbance or muscular weakness      Physical Exam:   /68   Ht 5' 5" (1.651 m)   Wt 54.5 kg (120 lb 2.4 oz)   LMP 05/08/2025   BMI 19.99 kg/m²   Constitutional: She appears alert and responsive. She appears well-developed, well-groomed, and well-nourished. No distress.   HENT:   Head: Normocephalic and atraumatic.   Eyes: Conjunctivae and EOM are normal. No scleral icterus.   Neck: Symmetrical. Normal range of motion. Neck supple. No tracheal deviation present. THYROID:  without masses or tenderness.  Cardiovascular: Normal rate, no rhythm abnormality noted. Extremities without swelling or edema, warm.    Pulmonary/Chest: Normal respiratory Effort. No distress or retractions. She exhibits no tenderness.  Breasts: are symmetrical.no masses    Right breast exhibits no inverted nipple, no mass, no nipple discharge, no skin change and no tenderness.   Left breast exhibits no inverted nipple, no mass, no nipple discharge, no skin change and no tenderness.  Abdominal: Soft. She exhibits no distension, hernias or masses. There is no tenderness. No enlargement of liver edge or spleen.  There is no rebound and no guarding.   Genitourinary:    External rectal exam shows no thrombosed external hemorrhoids, no lesions.     Pelvic exam was performed with patient supine.   No labial fusion, and symmetrical.    There is no rash, lesion or injury on the right labia.   There is no rash, lesion or injury on the left labia.   No bleeding and no signs of injury around the " vaginal introitus, urethral meatus is normal size and without prolapse or lesions, urethra well supported. The cervix is visualized with no discharge, lesions or friability.   No vaginal discharge found.    No significant Cystocele, Enterocele or rectocele, and cervix and uterus well supported.   Bimanual exam:   The urethra is normal to palpation and there are no palpable vaginal wall masses.   Uterus is not deviated, not enlarged, not fixed, normal shape and not tender.   Cervix exhibits no motion tenderness.    Right adnexum displays no mass or nodularity and no tenderness.   Left adnexum displays no mass or nodularity and no tenderness.  Musculoskeletal: Normal range of motion.   Lymphadenopathy: No inguinal adenopathy present.   Neurological: She is alert and oriented to person, place, and time. Coordination normal.   Skin: Skin is warm and dry. She is not diaphoretic. No rashes, lesions or ulcers.   Psychiatric: She has a normal mood and affect, oriented to person, place, and time.      Assessment:   Normal annual GYN exam  1. Encounter for annual routine gynecological examination        2. Hx of abnormal cervical Pap smear        3. Screening breast examination  Mammo Digital Screening Bilat w/ Shaheen (XPD)        Finished HPV vaccine    Plan:   PAP  Mammogram Aug  Follow up in 6 months pap if normal.  Patient informed will be contacted with results within 2 weeks. Encouraged to please call back or email if she has not heard from us by then.         [1]   Current Outpatient Medications on File Prior to Visit   Medication Sig Dispense Refill    AUVELITY  mg TbIE       butalbital-acetaminophen-caffeine -40 mg (FIORICET, ESGIC) -40 mg per tablet Take 1 tablet by mouth as needed for migraine. No more than 10 tab per month. 10 tablet 0    fluticasone propionate (CUTIVATE) 0.05 % cream       galcanezumab-gnlm (EMGALITY PEN) 120 mg/mL PnIj Inject 1 mL (120 mg total) into the skin every 28 days. 1 mL  11    lamoTRIgine (LAMICTAL) 100 MG tablet       lamoTRIgine (LAMICTAL) 25 MG tablet Take by mouth.      levomefolate calcium (ELFOLATE) 15 mg Tab       naltrexone (DEPADE) 50 mg tablet Take 50 mg by mouth.      onabotulinumtoxinA (BOTOX INJ) Inject as directed. Every 3 months facial area      ondansetron (ZOFRAN-ODT) 4 MG TbDL Take 1 tablet (4 mg total) by mouth every 6 (six) hours as needed (nausea). 30 tablet 11    propranoloL (INDERAL) 10 MG tablet       rimegepant (NURTEC) 75 mg odt Take 1 tablet (75 mg total) by mouth daily as needed for Migraine. Place ODT tablet on the tongue; alternatively the ODT tablet may be placed under the tongue 9 tablet 11    buPROPion (WELLBUTRIN XL) 150 MG TB24 tablet Take 150 mg by mouth every morning.      dextromethorphan-bupropion (AUVELITY)  mg TbIE Take 1 tablet by mouth 2 (two) times a day. 60 tablet 6    polyethylene glycol 3350 (MIRALAX ORAL) 0       No current facility-administered medications on file prior to visit.   [2]   Social History  Socioeconomic History    Marital status:    Tobacco Use    Smoking status: Never    Smokeless tobacco: Never   Substance and Sexual Activity    Alcohol use: Not Currently     Comment: stopped drinking 5 months ago    Drug use: No     Social Drivers of Health     Financial Resource Strain: Low Risk  (4/4/2025)    Overall Financial Resource Strain (CARDIA)     Difficulty of Paying Living Expenses: Not hard at all   Food Insecurity: No Food Insecurity (4/4/2025)    Hunger Vital Sign     Worried About Running Out of Food in the Last Year: Never true     Ran Out of Food in the Last Year: Never true   Transportation Needs: No Transportation Needs (4/4/2025)    PRAPARE - Transportation     Lack of Transportation (Medical): No     Lack of Transportation (Non-Medical): No   Physical Activity: Inactive (4/4/2025)    Exercise Vital Sign     Days of Exercise per Week: 0 days     Minutes of Exercise per Session: 0 min   Stress: Stress  Concern Present (4/4/2025)    [employer] [place] [employer] - Occupational Stress Questionnaire     Feeling of Stress : To some extent   Housing Stability: Low Risk  (4/4/2025)    Housing Stability Vital Sign     Unable to Pay for Housing in the Last Year: No     Number of Times Moved in the Last Year: 0     Homeless in the Last Year: No

## 2025-06-10 ENCOUNTER — RESULTS FOLLOW-UP (OUTPATIENT)
Dept: OBSTETRICS AND GYNECOLOGY | Facility: CLINIC | Age: 45
End: 2025-06-10

## 2025-06-17 ENCOUNTER — PATIENT MESSAGE (OUTPATIENT)
Dept: NEUROLOGY | Facility: CLINIC | Age: 45
End: 2025-06-17
Payer: COMMERCIAL

## 2025-06-17 ENCOUNTER — TELEPHONE (OUTPATIENT)
Dept: NEUROLOGY | Facility: CLINIC | Age: 45
End: 2025-06-17
Payer: COMMERCIAL

## 2025-06-17 NOTE — TELEPHONE ENCOUNTER
Spoke to patient and she said that she would keep her original appt because there was no open appts before 7/17/25. Patient was satisfied

## 2025-07-17 ENCOUNTER — PROCEDURE VISIT (OUTPATIENT)
Dept: NEUROLOGY | Facility: CLINIC | Age: 45
End: 2025-07-17
Payer: COMMERCIAL

## 2025-07-17 VITALS
WEIGHT: 120.13 LBS | DIASTOLIC BLOOD PRESSURE: 75 MMHG | HEART RATE: 96 BPM | BODY MASS INDEX: 20.01 KG/M2 | SYSTOLIC BLOOD PRESSURE: 98 MMHG | HEIGHT: 65 IN | RESPIRATION RATE: 17 BRPM

## 2025-07-17 DIAGNOSIS — G43.719 INTRACTABLE CHRONIC MIGRAINE WITHOUT AURA AND WITHOUT STATUS MIGRAINOSUS: Primary | ICD-10-CM

## 2025-07-17 NOTE — PROGRESS NOTES
A time out was conducted just before the start of the procedure to verify the correct patient and procedure, procedure location, and all relevant critical information.      Conventional methods of treatment such as multiple medications, both on and   off label have been tried including:     The patient has been unresponsive and refractory.The patient meets criteria for chronic headaches according to the ICHD-II, the patient has more than 15 headaches a month which last for more than 4 hours a day.     Botox session number: 1  Last session was 12 weeks ago and resulted in improvement of: n/a     I am aiming for at least 50%  improvement in the patient's symptoms. Frequency of treatment is every 3 months unless no response to the treatments, at which time we will discontinue the injections.      DESCRIPTION OF PROCEDURE: After obtaining informed consent and under   aseptic technique, a total of 80 units of botulinum toxin type A were   injected in the following muscles:        -- Temporalis 20 units bilaterally  -- Occipitalis 20 units bilaterally       The patient tolerated the procedure well. There were no complications. The patient was given a prescription for repeat treatment in 12 weeks      Unavoidable waste 20 units    BROCK Monteiro

## 2025-08-22 DIAGNOSIS — G43.719 INTRACTABLE CHRONIC MIGRAINE WITHOUT AURA AND WITHOUT STATUS MIGRAINOSUS: ICD-10-CM

## 2025-08-22 RX ORDER — GALCANEZUMAB 120 MG/ML
120 INJECTION, SOLUTION SUBCUTANEOUS
Qty: 1 ML | Refills: 11 | Status: CANCELLED | OUTPATIENT
Start: 2025-08-22

## 2025-08-22 RX ORDER — GALCANEZUMAB 120 MG/ML
120 INJECTION, SOLUTION SUBCUTANEOUS
Qty: 1 ML | Refills: 11 | Status: SHIPPED | OUTPATIENT
Start: 2025-08-22

## 2025-09-05 ENCOUNTER — OFFICE VISIT (OUTPATIENT)
Dept: NEUROLOGY | Facility: CLINIC | Age: 45
End: 2025-09-05
Payer: COMMERCIAL

## 2025-09-05 DIAGNOSIS — G43.419 INTRACTABLE HEMIPLEGIC MIGRAINE WITHOUT STATUS MIGRAINOSUS: ICD-10-CM

## 2025-09-05 DIAGNOSIS — G43.719 INTRACTABLE CHRONIC MIGRAINE WITHOUT AURA AND WITHOUT STATUS MIGRAINOSUS: Primary | ICD-10-CM

## 2025-09-05 RX ORDER — PANTOPRAZOLE SODIUM 40 MG/1
90 TABLET, DELAYED RELEASE ORAL
COMMUNITY
Start: 2025-08-25